# Patient Record
Sex: FEMALE | Race: WHITE | NOT HISPANIC OR LATINO | Employment: FULL TIME | URBAN - METROPOLITAN AREA
[De-identification: names, ages, dates, MRNs, and addresses within clinical notes are randomized per-mention and may not be internally consistent; named-entity substitution may affect disease eponyms.]

---

## 2020-02-06 ENCOUNTER — OFFICE VISIT (OUTPATIENT)
Dept: ORTHOPEDIC SURGERY | Facility: CLINIC | Age: 50
End: 2020-02-06

## 2020-02-06 VITALS — OXYGEN SATURATION: 98 % | BODY MASS INDEX: 33.46 KG/M2 | HEIGHT: 64 IN | WEIGHT: 196 LBS | HEART RATE: 87 BPM

## 2020-02-06 DIAGNOSIS — M17.11 PRIMARY OSTEOARTHRITIS OF RIGHT KNEE: Primary | ICD-10-CM

## 2020-02-06 DIAGNOSIS — M25.561 RIGHT KNEE PAIN, UNSPECIFIED CHRONICITY: ICD-10-CM

## 2020-02-06 PROCEDURE — 99204 OFFICE O/P NEW MOD 45 MIN: CPT | Performed by: ORTHOPAEDIC SURGERY

## 2020-02-06 RX ORDER — MELOXICAM 15 MG/1
TABLET ORAL
Qty: 60 TABLET | Refills: 0 | Status: SHIPPED | OUTPATIENT
Start: 2020-02-06

## 2020-02-06 NOTE — PROGRESS NOTES
Orthopaedic Clinic Note: Knee New Patient    Chief Complaint   Patient presents with   • Right Knee - Pain, Edema        HPI    Karina Hassan is a 50 y.o. female who presents with right knee pain for 6 day(s). Onset Has been atraumatic and gradual nature. Pain is localized to the anterior knee, popliteal region and is a 8/10 on the pain scale. Pain is described as aching and stabbing. Associated symptoms include pain, swelling, grinding, stiffness and giving way/buckling. The pain is worse with walking, standing, sitting, climbing stairs and leisure; resting, ice and heat make it better. Previous treatments have included: NSAIDS since symptom onset. Although some transient relief was reported with these interventions, these conservative measures have failed and symptoms have persisted. The patient is limited in daily activities and has had a significant decrease in quality of life as a result. She denies fevers, chills, or constitutional symptoms.    I have reviewed the following portions of the patient's history:History of Present Illness    History reviewed. No pertinent past medical history.   Past Surgical History:   Procedure Laterality Date   • ANKLE OPEN REDUCTION INTERNAL FIXATION Left    • CHOLECYSTECTOMY     • HERNIA REPAIR     • TIBIA FRACTURE SURGERY Left       Family History   Adopted: Yes     Social History     Socioeconomic History   • Marital status:      Spouse name: Not on file   • Number of children: Not on file   • Years of education: Not on file   • Highest education level: Not on file   Tobacco Use   • Smoking status: Never Smoker   • Smokeless tobacco: Never Used   Substance and Sexual Activity   • Alcohol use: Yes     Comment: rare   • Drug use: Never   • Sexual activity: Defer      Current Outpatient Medications on File Prior to Visit   Medication Sig Dispense Refill   • CINNAMON PO Take  by mouth Daily.     • Multiple Vitamins-Minerals (MULTIVITAMIN PO) Take  by mouth Daily.     •  "NON FORMULARY Daily. Tumeric, Keratin, Curcumin       No current facility-administered medications on file prior to visit.       Allergies   Allergen Reactions   • Other Anaphylaxis     Shellfish   GI intolerant -  strawberries and tomatoes        Review of Systems   Constitutional: Negative.    HENT: Negative.    Eyes: Negative.    Respiratory: Negative.    Cardiovascular: Negative.    Gastrointestinal: Negative.    Endocrine: Negative.    Genitourinary: Negative.    Musculoskeletal: Positive for arthralgias and joint swelling.   Skin: Negative.    Allergic/Immunologic: Negative.    Neurological: Negative.    Hematological: Negative.    Psychiatric/Behavioral: Negative.         The patient's Review of Systems was personally reviewed and confirmed as accurate.    The following portions of the patient's history were reviewed and updated as appropriate: allergies, current medications, past family history, past medical history, past social history, past surgical history and problem list.    Physical Exam  Pulse 87, height 162.6 cm (64\"), weight 88.9 kg (196 lb), SpO2 98 %.    Body mass index is 33.64 kg/m².    GENERAL APPEARANCE: awake, alert & oriented x 3, in no acute distress and well developed, well nourished  PSYCH: normal affect  LUNGS:  breathing nonlabored  EYES: sclera anicteric  CARDIOVASCULAR: palpable dorsalis pedis, palpable posterior tibial bilaterally. Capillary refill less than 2 seconds  EXTREMITIES: no clubbing, cyanosis  GAIT:  Antalgic            Right Lower Extremity Exam:   ----------  Hip Exam  ----------  FLEXION CONTRACTURE: None  FLEXION: 110 degrees  INTERNAL ROTATION: 20 degrees at 90 degrees of flexion   EXTERNAL ROTATION: 40 degrees at 90 degrees of flexion    PAIN WITH HIP MOTION: no  ----------  Knee Exam  ----------  ALIGNMENT: neutral, no varus or valgus deformity     RANGE OF MOTION:   Decreased (3-115 degrees) with no extensor lag  LIGAMENTOUS STABILITY:   stable to varus and valgus " stress at 0 and 30 degrees without any evidence of laxity     STRENGTH:  5/5 knee flexion, extension. 5/5 ankle dorsiflexion and plantarflexion.     PAIN WITH PALPATION: Tender to palpation in the anterior medial anterior lateral joint, palpable and tender Baker's cyst posteriorly   KNEE EFFUSION: Trace  PAIN WITH KNEE ROM: Yes  PATELLAR CREPITUS: yes, painful and symptomatic  SPECIAL EXAM FINDINGS:  Negative patellar compression    REFLEXES:  PATELLAR 2+/4  ACHILLES 2+/4    CLONUS: negative  STRAIGHT LEG TEST:   negative    SENSATION TO LIGHT TOUCH:  DEEP PERONEAL/SUPERFICIAL PERONEAL/SURAL/SAPHENOUS/TIBIAL:   intact    EDEMA:  no  ERYTHEMA:  no  WOUNDS/INCISIONS: no overlying skin problems.      Left Lower Extremity Exam:   ----------  Hip Exam  ----------  FLEXION CONTRACTURE: None  FLEXION: 110 degrees  INTERNAL ROTATION: 20 degrees at 90 degrees of flexion   EXTERNAL ROTATION: 40 degrees at 90 degrees of flexion    PAIN WITH HIP MOTION: no  ----------  Knee Exam  ----------  ALIGNMENT: neutral, no varus or valgus deformity     RANGE OF MOTION:  Normal (0-120 degrees) with no extensor lag or flexion contracture  LIGAMENTOUS STABILITY:   stable to varus and valgus stress at 0 and 30 degrees without any evidence of laxity     STRENGTH:  5/5 knee flexion, extension. 5/5 ankle dorsiflexion and plantarflexion.     PAIN WITH PALPATION: denies tenderness to palpation about the knee, denies medial or lateral joint line pain  KNEE EFFUSION: no  PAIN WITH KNEE ROM: no  PATELLAR CREPITUS: yes, asymptomatic  SPECIAL EXAM FINDINGS:  Negative patellar compression    REFLEXES:  PATELLAR 2+/4  ACHILLES 2+/4    CLONUS: negative  STRAIGHT LEG TEST:   negative    SENSATION TO LIGHT TOUCH:  DEEP PERONEAL/SUPERFICIAL PERONEAL/SURAL/SAPHENOUS/TIBIAL:   intact    EDEMA:  no  ERYTHEMA:  no  WOUNDS/INCISIONS: no overlying skin  problems.    ______________________________________________________________________  ______________________________________________________________________    RADIOGRAPHIC FINDINGS:   Indication: Right knee pain    Comparison: No prior xrays are available for comparison    Right knee(s) 4 views: Mild to moderate tricompartmental osteoarthritis with no acute bony injury or fracture.      Assessment/Plan:   Diagnosis Plan   1. Primary osteoarthritis of right knee  meloxicam (MOBIC) 15 MG tablet   2. Right knee pain, unspecified chronicity  XR Knee 4+ View Right     Patient appears to have an arthritic flareup.  I see no clinical radiographic evidence of acute bony injury or ligamentous pathology.  She does have a knee effusion with diffuse pain throughout the knee.  I recommended treatment options including oral anti-inflammatory versus cortisone injection.  She is agreeable to the anti-inflammatory.  She will follow-up in 2 weeks.    Americo Short MD  02/06/20  2:11 PM

## 2020-03-30 ENCOUNTER — TELEPHONE (OUTPATIENT)
Dept: ORTHOPEDIC SURGERY | Facility: CLINIC | Age: 50
End: 2020-03-30

## 2020-03-30 NOTE — TELEPHONE ENCOUNTER
CALLED PATIENT TO OFFER MYCHART VIDEO VISIT OR TELEPHONE VISIT. PATIENT DENIED BOTH OPTIONS AND RESCHEDULED APPOINTMENT.

## 2024-09-13 ENCOUNTER — HOSPITAL ENCOUNTER (INPATIENT)
Facility: HOSPITAL | Age: 54
LOS: 2 days | Discharge: HOME | DRG: 322 | End: 2024-09-15
Attending: INTERNAL MEDICINE | Admitting: INTERNAL MEDICINE
Payer: COMMERCIAL

## 2024-09-13 DIAGNOSIS — E11.65 TYPE 2 DIABETES MELLITUS WITH HYPERGLYCEMIA, WITHOUT LONG-TERM CURRENT USE OF INSULIN: Primary | ICD-10-CM

## 2024-09-13 PROBLEM — I21.19 STEMI INVOLVING OTH CORONARY ARTERY OF INFERIOR WALL: Status: ACTIVE | Noted: 2024-09-13

## 2024-09-13 LAB
ACT BLD: 165 SECONDS (ref 82–152)
GEN 5 2HR TROPONIN T REFLEX: 1422 NG/L
TROPONIN T DELTA: 757 NG/L
TROPONIN T SERPL HS-MCNC: 665 NG/L

## 2024-09-13 PROCEDURE — C1769 GUIDE WIRE: HCPCS | Performed by: INTERNAL MEDICINE

## 2024-09-13 PROCEDURE — 25010000002 EPTIFIBATIDE 20 MG/10ML SOLUTION: Performed by: INTERNAL MEDICINE

## 2024-09-13 PROCEDURE — C1894 INTRO/SHEATH, NON-LASER: HCPCS | Performed by: INTERNAL MEDICINE

## 2024-09-13 PROCEDURE — C1725 CATH, TRANSLUMIN NON-LASER: HCPCS | Performed by: INTERNAL MEDICINE

## 2024-09-13 PROCEDURE — C1874 STENT, COATED/COV W/DEL SYS: HCPCS | Performed by: INTERNAL MEDICINE

## 2024-09-13 PROCEDURE — B2111ZZ FLUOROSCOPY OF MULTIPLE CORONARY ARTERIES USING LOW OSMOLAR CONTRAST: ICD-10-PCS | Performed by: INTERNAL MEDICINE

## 2024-09-13 PROCEDURE — 93458 L HRT ARTERY/VENTRICLE ANGIO: CPT | Performed by: INTERNAL MEDICINE

## 2024-09-13 PROCEDURE — 027034Z DILATION OF CORONARY ARTERY, ONE ARTERY WITH DRUG-ELUTING INTRALUMINAL DEVICE, PERCUTANEOUS APPROACH: ICD-10-PCS | Performed by: INTERNAL MEDICINE

## 2024-09-13 PROCEDURE — 25010000002 MIDAZOLAM PER 1 MG: Performed by: INTERNAL MEDICINE

## 2024-09-13 PROCEDURE — 25010000002 HEPARIN (PORCINE) PER 1000 UNITS: Performed by: INTERNAL MEDICINE

## 2024-09-13 PROCEDURE — 99223 1ST HOSP IP/OBS HIGH 75: CPT | Performed by: INTERNAL MEDICINE

## 2024-09-13 PROCEDURE — 25010000002 DIPHENHYDRAMINE PER 50 MG: Performed by: INTERNAL MEDICINE

## 2024-09-13 PROCEDURE — 92941 PRQ TRLML REVSC TOT OCCL AMI: CPT | Performed by: INTERNAL MEDICINE

## 2024-09-13 PROCEDURE — 25510000001 IOPAMIDOL PER 1 ML: Performed by: INTERNAL MEDICINE

## 2024-09-13 PROCEDURE — C9606 PERC D-E COR REVASC W AMI S: HCPCS | Performed by: INTERNAL MEDICINE

## 2024-09-13 PROCEDURE — 84484 ASSAY OF TROPONIN QUANT: CPT | Performed by: INTERNAL MEDICINE

## 2024-09-13 PROCEDURE — 85347 COAGULATION TIME ACTIVATED: CPT

## 2024-09-13 PROCEDURE — B2151ZZ FLUOROSCOPY OF LEFT HEART USING LOW OSMOLAR CONTRAST: ICD-10-PCS | Performed by: INTERNAL MEDICINE

## 2024-09-13 PROCEDURE — C1887 CATHETER, GUIDING: HCPCS | Performed by: INTERNAL MEDICINE

## 2024-09-13 PROCEDURE — 4A023N7 MEASUREMENT OF CARDIAC SAMPLING AND PRESSURE, LEFT HEART, PERCUTANEOUS APPROACH: ICD-10-PCS | Performed by: INTERNAL MEDICINE

## 2024-09-13 PROCEDURE — 25010000002 FENTANYL CITRATE (PF) 50 MCG/ML SOLUTION: Performed by: INTERNAL MEDICINE

## 2024-09-13 DEVICE — XIENCE SKYPOINT™ EVEROLIMUS ELUTING CORONARY STENT SYSTEM 3.00 MM X 18 MM / RAPID-EXCHANGE
Type: IMPLANTABLE DEVICE | Site: CORONARY | Status: FUNCTIONAL
Brand: XIENCE SKYPOINT™

## 2024-09-13 RX ORDER — FENTANYL CITRATE 50 UG/ML
INJECTION, SOLUTION INTRAMUSCULAR; INTRAVENOUS
Status: DISCONTINUED | OUTPATIENT
Start: 2024-09-13 | End: 2024-09-13 | Stop reason: HOSPADM

## 2024-09-13 RX ORDER — ALPRAZOLAM 0.25 MG
0.25 TABLET ORAL 3 TIMES DAILY PRN
Status: DISCONTINUED | OUTPATIENT
Start: 2024-09-13 | End: 2024-09-15 | Stop reason: HOSPADM

## 2024-09-13 RX ORDER — NALOXONE HCL 0.4 MG/ML
0.4 VIAL (ML) INJECTION
Status: DISCONTINUED | OUTPATIENT
Start: 2024-09-13 | End: 2024-09-15 | Stop reason: HOSPADM

## 2024-09-13 RX ORDER — METOPROLOL SUCCINATE 25 MG/1
25 TABLET, EXTENDED RELEASE ORAL DAILY
Status: DISCONTINUED | OUTPATIENT
Start: 2024-09-13 | End: 2024-09-14

## 2024-09-13 RX ORDER — LIDOCAINE HYDROCHLORIDE 10 MG/ML
INJECTION, SOLUTION EPIDURAL; INFILTRATION; INTRACAUDAL; PERINEURAL
Status: DISCONTINUED | OUTPATIENT
Start: 2024-09-13 | End: 2024-09-13 | Stop reason: HOSPADM

## 2024-09-13 RX ORDER — ROSUVASTATIN CALCIUM 20 MG/1
20 TABLET, COATED ORAL NIGHTLY
Status: DISCONTINUED | OUTPATIENT
Start: 2024-09-13 | End: 2024-09-15 | Stop reason: HOSPADM

## 2024-09-13 RX ORDER — HYDROCODONE BITARTRATE AND ACETAMINOPHEN 7.5; 325 MG/1; MG/1
1 TABLET ORAL EVERY 4 HOURS PRN
Status: DISCONTINUED | OUTPATIENT
Start: 2024-09-13 | End: 2024-09-15 | Stop reason: HOSPADM

## 2024-09-13 RX ORDER — HEPARIN SODIUM 1000 [USP'U]/ML
INJECTION, SOLUTION INTRAVENOUS; SUBCUTANEOUS
Status: DISCONTINUED | OUTPATIENT
Start: 2024-09-13 | End: 2024-09-13 | Stop reason: HOSPADM

## 2024-09-13 RX ORDER — METOPROLOL TARTRATE 1 MG/ML
INJECTION, SOLUTION INTRAVENOUS
Status: DISCONTINUED | OUTPATIENT
Start: 2024-09-13 | End: 2024-09-13 | Stop reason: HOSPADM

## 2024-09-13 RX ORDER — DIPHENHYDRAMINE HYDROCHLORIDE 50 MG/ML
INJECTION INTRAMUSCULAR; INTRAVENOUS
Status: DISCONTINUED | OUTPATIENT
Start: 2024-09-13 | End: 2024-09-13 | Stop reason: HOSPADM

## 2024-09-13 RX ORDER — IOPAMIDOL 755 MG/ML
INJECTION, SOLUTION INTRAVASCULAR
Status: DISCONTINUED | OUTPATIENT
Start: 2024-09-13 | End: 2024-09-13 | Stop reason: HOSPADM

## 2024-09-13 RX ORDER — ACETAMINOPHEN 325 MG/1
650 TABLET ORAL EVERY 4 HOURS PRN
Status: DISCONTINUED | OUTPATIENT
Start: 2024-09-13 | End: 2024-09-15 | Stop reason: HOSPADM

## 2024-09-13 RX ORDER — SODIUM CHLORIDE 9 MG/ML
250 INJECTION, SOLUTION INTRAVENOUS ONCE AS NEEDED
Status: DISCONTINUED | OUTPATIENT
Start: 2024-09-13 | End: 2024-09-15 | Stop reason: HOSPADM

## 2024-09-13 RX ORDER — MORPHINE SULFATE 2 MG/ML
1 INJECTION, SOLUTION INTRAMUSCULAR; INTRAVENOUS EVERY 4 HOURS PRN
Status: DISCONTINUED | OUTPATIENT
Start: 2024-09-13 | End: 2024-09-15 | Stop reason: HOSPADM

## 2024-09-13 RX ORDER — MIDAZOLAM HYDROCHLORIDE 1 MG/ML
INJECTION INTRAMUSCULAR; INTRAVENOUS
Status: DISCONTINUED | OUTPATIENT
Start: 2024-09-13 | End: 2024-09-13 | Stop reason: HOSPADM

## 2024-09-13 RX ORDER — LOSARTAN POTASSIUM 25 MG/1
25 TABLET ORAL DAILY
Status: DISCONTINUED | OUTPATIENT
Start: 2024-09-13 | End: 2024-09-14

## 2024-09-13 RX ORDER — EPTIFIBATIDE 2 MG/ML
INJECTION, SOLUTION INTRAVENOUS
Status: DISCONTINUED | OUTPATIENT
Start: 2024-09-13 | End: 2024-09-13 | Stop reason: HOSPADM

## 2024-09-13 RX ORDER — NITROGLYCERIN 0.4 MG/1
0.4 TABLET SUBLINGUAL
Status: DISCONTINUED | OUTPATIENT
Start: 2024-09-13 | End: 2024-09-15 | Stop reason: HOSPADM

## 2024-09-13 RX ORDER — SODIUM CHLORIDE 9 MG/ML
100 INJECTION, SOLUTION INTRAVENOUS CONTINUOUS
Status: ACTIVE | OUTPATIENT
Start: 2024-09-13 | End: 2024-09-13

## 2024-09-14 ENCOUNTER — APPOINTMENT (OUTPATIENT)
Dept: CARDIOLOGY | Facility: HOSPITAL | Age: 54
DRG: 322 | End: 2024-09-14
Payer: COMMERCIAL

## 2024-09-14 PROBLEM — I21.11 STEMI INVOLVING RIGHT CORONARY ARTERY: Status: ACTIVE | Noted: 2024-09-14

## 2024-09-14 LAB
ANION GAP SERPL CALCULATED.3IONS-SCNC: 13 MMOL/L (ref 5–15)
ASCENDING AORTA: 2.8 CM
BH CV ECHO MEAS - AO MAX PG: 11.3 MMHG
BH CV ECHO MEAS - AO MEAN PG: 6 MMHG
BH CV ECHO MEAS - AO ROOT DIAM: 2.8 CM
BH CV ECHO MEAS - AO V2 MAX: 168 CM/SEC
BH CV ECHO MEAS - AO V2 VTI: 30.9 CM
BH CV ECHO MEAS - AVA(I,D): 2.15 CM2
BH CV ECHO MEAS - EDV(CUBED): 117.6 ML
BH CV ECHO MEAS - EDV(MOD-SP2): 84.1 ML
BH CV ECHO MEAS - EDV(MOD-SP4): 95.6 ML
BH CV ECHO MEAS - EF(MOD-BP): 57.3 %
BH CV ECHO MEAS - EF(MOD-SP2): 56 %
BH CV ECHO MEAS - EF(MOD-SP4): 54.7 %
BH CV ECHO MEAS - ESV(CUBED): 39.3 ML
BH CV ECHO MEAS - ESV(MOD-SP2): 37 ML
BH CV ECHO MEAS - ESV(MOD-SP4): 43.3 ML
BH CV ECHO MEAS - FS: 30.6 %
BH CV ECHO MEAS - IVS/LVPW: 0.78 CM
BH CV ECHO MEAS - IVSD: 0.7 CM
BH CV ECHO MEAS - LA DIMENSION: 3.2 CM
BH CV ECHO MEAS - LAT PEAK E' VEL: 11.3 CM/SEC
BH CV ECHO MEAS - LV DIASTOLIC VOL/BSA (35-75): 50.2 CM2
BH CV ECHO MEAS - LV MASS(C)D: 131.2 GRAMS
BH CV ECHO MEAS - LV MAX PG: 4.4 MMHG
BH CV ECHO MEAS - LV MEAN PG: 2 MMHG
BH CV ECHO MEAS - LV SYSTOLIC VOL/BSA (12-30): 22.7 CM2
BH CV ECHO MEAS - LV V1 MAX: 105 CM/SEC
BH CV ECHO MEAS - LV V1 VTI: 21.1 CM
BH CV ECHO MEAS - LVIDD: 4.9 CM
BH CV ECHO MEAS - LVIDS: 3.4 CM
BH CV ECHO MEAS - LVOT AREA: 3.1 CM2
BH CV ECHO MEAS - LVOT DIAM: 2 CM
BH CV ECHO MEAS - LVPWD: 0.9 CM
BH CV ECHO MEAS - MED PEAK E' VEL: 6.7 CM/SEC
BH CV ECHO MEAS - MV A MAX VEL: 113 CM/SEC
BH CV ECHO MEAS - MV DEC SLOPE: 445 CM/SEC2
BH CV ECHO MEAS - MV DEC TIME: 0.19 SEC
BH CV ECHO MEAS - MV E MAX VEL: 104 CM/SEC
BH CV ECHO MEAS - MV E/A: 0.92
BH CV ECHO MEAS - MV MAX PG: 4.5 MMHG
BH CV ECHO MEAS - MV MEAN PG: 2 MMHG
BH CV ECHO MEAS - MV P1/2T: 74.4 MSEC
BH CV ECHO MEAS - MV V2 VTI: 29.7 CM
BH CV ECHO MEAS - MVA(P1/2T): 3 CM2
BH CV ECHO MEAS - MVA(VTI): 2.23 CM2
BH CV ECHO MEAS - PA ACC TIME: 0.19 SEC
BH CV ECHO MEAS - PI END-D VEL: 78.6 CM/SEC
BH CV ECHO MEAS - SV(LVOT): 66.3 ML
BH CV ECHO MEAS - SV(MOD-SP2): 47.1 ML
BH CV ECHO MEAS - SV(MOD-SP4): 52.3 ML
BH CV ECHO MEAS - SVI(LVOT): 34.8 ML/M2
BH CV ECHO MEAS - SVI(MOD-SP2): 24.7 ML/M2
BH CV ECHO MEAS - SVI(MOD-SP4): 27.5 ML/M2
BH CV ECHO MEAS - TAPSE (>1.6): 2.5 CM
BH CV ECHO MEASUREMENTS AVERAGE E/E' RATIO: 11.56
BH CV XLRA - RV BASE: 3.4 CM
BH CV XLRA - RV LENGTH: 7.4 CM
BH CV XLRA - RV MID: 2.6 CM
BH CV XLRA - TDI S': 17.7 CM/SEC
BUN SERPL-MCNC: 8 MG/DL (ref 6–20)
BUN/CREAT SERPL: 12.5 (ref 7–25)
CALCIUM SPEC-SCNC: 8.1 MG/DL (ref 8.6–10.5)
CHLORIDE SERPL-SCNC: 99 MMOL/L (ref 98–107)
CHOLEST SERPL-MCNC: 277 MG/DL (ref 0–200)
CO2 SERPL-SCNC: 21 MMOL/L (ref 22–29)
CREAT SERPL-MCNC: 0.64 MG/DL (ref 0.57–1)
EGFRCR SERPLBLD CKD-EPI 2021: 104.5 ML/MIN/1.73
GLUCOSE BLDC GLUCOMTR-MCNC: 252 MG/DL (ref 70–130)
GLUCOSE BLDC GLUCOMTR-MCNC: 253 MG/DL (ref 70–130)
GLUCOSE BLDC GLUCOMTR-MCNC: 302 MG/DL (ref 70–130)
GLUCOSE BLDC GLUCOMTR-MCNC: 320 MG/DL (ref 70–130)
GLUCOSE SERPL-MCNC: 320 MG/DL (ref 65–99)
HBA1C MFR BLD: 11.7 % (ref 4.8–5.6)
HDLC SERPL-MCNC: 37 MG/DL (ref 40–60)
LDLC SERPL CALC-MCNC: 148 MG/DL (ref 0–100)
LDLC/HDLC SERPL: 3.86 {RATIO}
LEFT ATRIUM VOLUME INDEX: 18.5 ML/M2
LV EF 2D ECHO EST: 55 %
POTASSIUM SERPL-SCNC: 4.2 MMOL/L (ref 3.5–5.2)
SODIUM SERPL-SCNC: 133 MMOL/L (ref 136–145)
TRIGL SERPL-MCNC: 486 MG/DL (ref 0–150)
VLDLC SERPL-MCNC: 92 MG/DL (ref 5–40)

## 2024-09-14 PROCEDURE — 82948 REAGENT STRIP/BLOOD GLUCOSE: CPT

## 2024-09-14 PROCEDURE — 80048 BASIC METABOLIC PNL TOTAL CA: CPT | Performed by: INTERNAL MEDICINE

## 2024-09-14 PROCEDURE — 63710000001 INSULIN LISPRO (HUMAN) PER 5 UNITS: Performed by: NURSE PRACTITIONER

## 2024-09-14 PROCEDURE — 83036 HEMOGLOBIN GLYCOSYLATED A1C: CPT | Performed by: INTERNAL MEDICINE

## 2024-09-14 PROCEDURE — 63710000001 INSULIN GLARGINE PER 5 UNITS: Performed by: NURSE PRACTITIONER

## 2024-09-14 PROCEDURE — 93306 TTE W/DOPPLER COMPLETE: CPT | Performed by: INTERNAL MEDICINE

## 2024-09-14 PROCEDURE — 80061 LIPID PANEL: CPT | Performed by: INTERNAL MEDICINE

## 2024-09-14 PROCEDURE — 93005 ELECTROCARDIOGRAM TRACING: CPT | Performed by: INTERNAL MEDICINE

## 2024-09-14 PROCEDURE — 99232 SBSQ HOSP IP/OBS MODERATE 35: CPT | Performed by: INTERNAL MEDICINE

## 2024-09-14 PROCEDURE — 99222 1ST HOSP IP/OBS MODERATE 55: CPT | Performed by: NURSE PRACTITIONER

## 2024-09-14 PROCEDURE — 93010 ELECTROCARDIOGRAM REPORT: CPT | Performed by: INTERNAL MEDICINE

## 2024-09-14 PROCEDURE — 93306 TTE W/DOPPLER COMPLETE: CPT

## 2024-09-14 RX ORDER — LOSARTAN POTASSIUM 25 MG/1
25 TABLET ORAL
Status: DISCONTINUED | OUTPATIENT
Start: 2024-09-14 | End: 2024-09-15 | Stop reason: HOSPADM

## 2024-09-14 RX ORDER — METOPROLOL SUCCINATE 25 MG/1
25 TABLET, EXTENDED RELEASE ORAL
Status: DISCONTINUED | OUTPATIENT
Start: 2024-09-14 | End: 2024-09-15 | Stop reason: HOSPADM

## 2024-09-14 RX ORDER — DEXTROSE MONOHYDRATE 25 G/50ML
10-50 INJECTION, SOLUTION INTRAVENOUS
Status: DISCONTINUED | OUTPATIENT
Start: 2024-09-14 | End: 2024-09-14

## 2024-09-14 RX ORDER — INSULIN LISPRO 100 [IU]/ML
1-200 INJECTION, SOLUTION INTRAVENOUS; SUBCUTANEOUS AS NEEDED
Status: DISCONTINUED | OUTPATIENT
Start: 2024-09-14 | End: 2024-09-14

## 2024-09-14 RX ORDER — IBUPROFEN 600 MG/1
1 TABLET ORAL
Status: DISCONTINUED | OUTPATIENT
Start: 2024-09-14 | End: 2024-09-14

## 2024-09-14 RX ORDER — DEXTROSE MONOHYDRATE 25 G/50ML
25 INJECTION, SOLUTION INTRAVENOUS
Status: DISCONTINUED | OUTPATIENT
Start: 2024-09-14 | End: 2024-09-15 | Stop reason: HOSPADM

## 2024-09-14 RX ORDER — INSULIN LISPRO 100 [IU]/ML
2-7 INJECTION, SOLUTION INTRAVENOUS; SUBCUTANEOUS
Status: DISCONTINUED | OUTPATIENT
Start: 2024-09-14 | End: 2024-09-14

## 2024-09-14 RX ORDER — INSULIN LISPRO 100 [IU]/ML
2-7 INJECTION, SOLUTION INTRAVENOUS; SUBCUTANEOUS
Status: DISCONTINUED | OUTPATIENT
Start: 2024-09-14 | End: 2024-09-15 | Stop reason: HOSPADM

## 2024-09-14 RX ORDER — INSULIN LISPRO 100 [IU]/ML
1-200 INJECTION, SOLUTION INTRAVENOUS; SUBCUTANEOUS
Status: DISCONTINUED | OUTPATIENT
Start: 2024-09-14 | End: 2024-09-14

## 2024-09-14 RX ORDER — NICOTINE POLACRILEX 4 MG
15 LOZENGE BUCCAL
Status: DISCONTINUED | OUTPATIENT
Start: 2024-09-14 | End: 2024-09-15 | Stop reason: HOSPADM

## 2024-09-14 RX ORDER — IBUPROFEN 600 MG/1
1 TABLET ORAL
Status: DISCONTINUED | OUTPATIENT
Start: 2024-09-14 | End: 2024-09-15 | Stop reason: HOSPADM

## 2024-09-14 RX ORDER — NICOTINE POLACRILEX 4 MG
15 LOZENGE BUCCAL
Status: DISCONTINUED | OUTPATIENT
Start: 2024-09-14 | End: 2024-09-14

## 2024-09-14 RX ADMIN — LOSARTAN POTASSIUM 25 MG: 25 TABLET, FILM COATED ORAL at 08:45

## 2024-09-14 RX ADMIN — ROSUVASTATIN 20 MG: 20 TABLET, FILM COATED ORAL at 21:09

## 2024-09-14 RX ADMIN — INSULIN LISPRO 4 UNITS: 100 INJECTION, SOLUTION INTRAVENOUS; SUBCUTANEOUS at 13:27

## 2024-09-14 RX ADMIN — METOPROLOL SUCCINATE 25 MG: 25 TABLET, EXTENDED RELEASE ORAL at 08:45

## 2024-09-14 RX ADMIN — ACETAMINOPHEN 650 MG: 325 TABLET ORAL at 13:33

## 2024-09-14 RX ADMIN — INSULIN GLARGINE 10 UNITS: 100 INJECTION, SOLUTION SUBCUTANEOUS at 13:27

## 2024-09-14 RX ADMIN — INSULIN LISPRO 5 UNITS: 100 INJECTION, SOLUTION INTRAVENOUS; SUBCUTANEOUS at 22:37

## 2024-09-14 RX ADMIN — TICAGRELOR 90 MG: 90 TABLET ORAL at 21:09

## 2024-09-14 RX ADMIN — INSULIN LISPRO 5 UNITS: 100 INJECTION, SOLUTION INTRAVENOUS; SUBCUTANEOUS at 17:20

## 2024-09-14 RX ADMIN — TICAGRELOR 90 MG: 90 TABLET ORAL at 08:41

## 2024-09-15 ENCOUNTER — READMISSION MANAGEMENT (OUTPATIENT)
Dept: CALL CENTER | Facility: HOSPITAL | Age: 54
End: 2024-09-15
Payer: COMMERCIAL

## 2024-09-15 VITALS
DIASTOLIC BLOOD PRESSURE: 78 MMHG | HEIGHT: 64 IN | BODY MASS INDEX: 32.14 KG/M2 | WEIGHT: 188.27 LBS | OXYGEN SATURATION: 94 % | RESPIRATION RATE: 18 BRPM | TEMPERATURE: 98.3 F | SYSTOLIC BLOOD PRESSURE: 130 MMHG | HEART RATE: 75 BPM

## 2024-09-15 PROBLEM — E11.65 TYPE 2 DIABETES MELLITUS WITH HYPERGLYCEMIA: Status: ACTIVE | Noted: 2024-09-15

## 2024-09-15 LAB
GEN 5 2HR TROPONIN T REFLEX: 397 NG/L
GLUCOSE BLDC GLUCOMTR-MCNC: 227 MG/DL (ref 70–130)
GLUCOSE BLDC GLUCOMTR-MCNC: 251 MG/DL (ref 70–130)
TROPONIN T DELTA: -24 NG/L
TROPONIN T SERPL HS-MCNC: 421 NG/L

## 2024-09-15 PROCEDURE — 84484 ASSAY OF TROPONIN QUANT: CPT | Performed by: INTERNAL MEDICINE

## 2024-09-15 PROCEDURE — 82948 REAGENT STRIP/BLOOD GLUCOSE: CPT

## 2024-09-15 PROCEDURE — 99238 HOSP IP/OBS DSCHRG MGMT 30/<: CPT | Performed by: INTERNAL MEDICINE

## 2024-09-15 PROCEDURE — 63710000001 INSULIN LISPRO (HUMAN) PER 5 UNITS: Performed by: NURSE PRACTITIONER

## 2024-09-15 PROCEDURE — 63710000001 INSULIN GLARGINE PER 5 UNITS: Performed by: PEDIATRICS

## 2024-09-15 PROCEDURE — 99232 SBSQ HOSP IP/OBS MODERATE 35: CPT | Performed by: PEDIATRICS

## 2024-09-15 RX ORDER — METOPROLOL SUCCINATE 25 MG/1
25 TABLET, EXTENDED RELEASE ORAL
Qty: 90 TABLET | Refills: 3 | Status: SHIPPED | OUTPATIENT
Start: 2024-09-16

## 2024-09-15 RX ORDER — LOSARTAN POTASSIUM 25 MG/1
25 TABLET ORAL
Qty: 90 TABLET | Refills: 3 | Status: SHIPPED | OUTPATIENT
Start: 2024-09-16

## 2024-09-15 RX ORDER — NITROGLYCERIN 0.4 MG/1
0.4 TABLET SUBLINGUAL
Qty: 25 TABLET | Refills: 12 | Status: SHIPPED | OUTPATIENT
Start: 2024-09-15

## 2024-09-15 RX ORDER — ASPIRIN 81 MG/1
81 TABLET ORAL DAILY
Qty: 90 TABLET | Refills: 3 | Status: CANCELLED | OUTPATIENT
Start: 2024-09-15

## 2024-09-15 RX ORDER — PEN NEEDLE, DIABETIC 30 GX5/16"
1 NEEDLE, DISPOSABLE MISCELLANEOUS DAILY
Qty: 100 EACH | Refills: 0 | Status: SHIPPED | OUTPATIENT
Start: 2024-09-15

## 2024-09-15 RX ORDER — ROSUVASTATIN CALCIUM 20 MG/1
20 TABLET, COATED ORAL NIGHTLY
Qty: 90 TABLET | Refills: 3 | Status: SHIPPED | OUTPATIENT
Start: 2024-09-15 | End: 2024-09-15

## 2024-09-15 RX ORDER — ROSUVASTATIN CALCIUM 20 MG/1
20 TABLET, COATED ORAL NIGHTLY
Qty: 90 TABLET | Refills: 3 | Status: SHIPPED | OUTPATIENT
Start: 2024-09-15

## 2024-09-15 RX ORDER — NITROGLYCERIN 0.4 MG/1
0.4 TABLET SUBLINGUAL
Qty: 25 TABLET | Refills: 12 | Status: SHIPPED | OUTPATIENT
Start: 2024-09-15 | End: 2024-09-15

## 2024-09-15 RX ORDER — ASPIRIN 81 MG/1
81 TABLET ORAL DAILY
Qty: 90 TABLET | Refills: 3 | Status: SHIPPED | OUTPATIENT
Start: 2024-09-16 | End: 2024-09-15

## 2024-09-15 RX ORDER — METOPROLOL SUCCINATE 25 MG/1
25 TABLET, EXTENDED RELEASE ORAL
Qty: 90 TABLET | Refills: 3 | Status: SHIPPED | OUTPATIENT
Start: 2024-09-16 | End: 2024-09-15

## 2024-09-15 RX ORDER — ASPIRIN 81 MG/1
81 TABLET ORAL DAILY
Status: DISCONTINUED | OUTPATIENT
Start: 2024-09-15 | End: 2024-09-15 | Stop reason: HOSPADM

## 2024-09-15 RX ORDER — ASPIRIN 81 MG/1
81 TABLET ORAL DAILY
Qty: 90 TABLET | Refills: 3 | Status: SHIPPED | OUTPATIENT
Start: 2024-09-16

## 2024-09-15 RX ORDER — LOSARTAN POTASSIUM 25 MG/1
25 TABLET ORAL
Qty: 90 TABLET | Refills: 3 | Status: SHIPPED | OUTPATIENT
Start: 2024-09-16 | End: 2024-09-15

## 2024-09-15 RX ADMIN — INSULIN LISPRO 3 UNITS: 100 INJECTION, SOLUTION INTRAVENOUS; SUBCUTANEOUS at 08:12

## 2024-09-15 RX ADMIN — INSULIN GLARGINE 25 UNITS: 100 INJECTION, SOLUTION SUBCUTANEOUS at 08:11

## 2024-09-15 RX ADMIN — INSULIN LISPRO 4 UNITS: 100 INJECTION, SOLUTION INTRAVENOUS; SUBCUTANEOUS at 12:25

## 2024-09-15 RX ADMIN — ASPIRIN 81 MG: 81 TABLET, COATED ORAL at 10:27

## 2024-09-15 RX ADMIN — LOSARTAN POTASSIUM 25 MG: 25 TABLET, FILM COATED ORAL at 08:11

## 2024-09-15 RX ADMIN — METOPROLOL SUCCINATE 25 MG: 25 TABLET, EXTENDED RELEASE ORAL at 08:11

## 2024-09-15 RX ADMIN — TICAGRELOR 90 MG: 90 TABLET ORAL at 08:11

## 2024-09-15 NOTE — OUTREACH NOTE
Prep Survey      Flowsheet Row Responses   [facility] patient discharged from? [place]   Is LACE score < 7 ? Yes   Eligibility [facility]   Date of Admission 09/13/24   Date of Discharge 09/15/24   Discharge Disposition Home or Self Care   Discharge diagnosis Coronary artery disease   Does the patient have one of the following disease processes/diagnoses(primary or secondary)? Other   Does the patient have Home health ordered? No   Is there a DME ordered? No   Prep survey completed? Yes            PRETTY CHILDERS - Registered Nurse

## 2024-09-16 ENCOUNTER — TRANSITIONAL CARE MANAGEMENT TELEPHONE ENCOUNTER (OUTPATIENT)
Dept: CALL CENTER | Facility: HOSPITAL | Age: 54
End: 2024-09-16
Payer: COMMERCIAL

## 2024-09-16 ENCOUNTER — DOCUMENTATION (OUTPATIENT)
Dept: CARDIAC REHAB | Facility: HOSPITAL | Age: 54
End: 2024-09-16
Payer: COMMERCIAL

## 2024-09-16 LAB
ACT BLD: 152 SECONDS (ref 82–152)
ACT BLD: 342 SECONDS (ref 82–152)
ACT BLD: 611 SECONDS (ref 82–152)
QT INTERVAL: 406 MS
QTC INTERVAL: 477 MS

## 2024-09-17 ENCOUNTER — TRANSITIONAL CARE MANAGEMENT TELEPHONE ENCOUNTER (OUTPATIENT)
Dept: CALL CENTER | Facility: HOSPITAL | Age: 54
End: 2024-09-17
Payer: COMMERCIAL

## 2024-09-19 ENCOUNTER — OFFICE VISIT (OUTPATIENT)
Dept: FAMILY MEDICINE CLINIC | Facility: CLINIC | Age: 54
End: 2024-09-19
Payer: COMMERCIAL

## 2024-09-19 VITALS
OXYGEN SATURATION: 97 % | BODY MASS INDEX: 31.96 KG/M2 | HEIGHT: 64 IN | SYSTOLIC BLOOD PRESSURE: 114 MMHG | WEIGHT: 187.2 LBS | DIASTOLIC BLOOD PRESSURE: 66 MMHG | HEART RATE: 85 BPM

## 2024-09-19 DIAGNOSIS — I10 PRIMARY HYPERTENSION: ICD-10-CM

## 2024-09-19 DIAGNOSIS — Z79.4 TYPE 2 DIABETES MELLITUS WITH HYPERGLYCEMIA, WITH LONG-TERM CURRENT USE OF INSULIN: Primary | ICD-10-CM

## 2024-09-19 DIAGNOSIS — I21.11 STEMI INVOLVING RIGHT CORONARY ARTERY: ICD-10-CM

## 2024-09-19 DIAGNOSIS — R22.2 MASS ON BACK: ICD-10-CM

## 2024-09-19 DIAGNOSIS — E11.65 TYPE 2 DIABETES MELLITUS WITH HYPERGLYCEMIA, WITH LONG-TERM CURRENT USE OF INSULIN: Primary | ICD-10-CM

## 2024-09-19 PROCEDURE — 99495 TRANSJ CARE MGMT MOD F2F 14D: CPT | Performed by: INTERNAL MEDICINE

## 2024-09-19 PROCEDURE — 3046F HEMOGLOBIN A1C LEVEL >9.0%: CPT | Performed by: INTERNAL MEDICINE

## 2024-09-20 ENCOUNTER — OFFICE VISIT (OUTPATIENT)
Dept: CARDIOLOGY | Facility: HOSPITAL | Age: 54
End: 2024-09-20
Payer: COMMERCIAL

## 2024-09-20 VITALS
DIASTOLIC BLOOD PRESSURE: 61 MMHG | WEIGHT: 188 LBS | BODY MASS INDEX: 32.1 KG/M2 | HEIGHT: 64 IN | OXYGEN SATURATION: 96 % | SYSTOLIC BLOOD PRESSURE: 125 MMHG | HEART RATE: 83 BPM

## 2024-09-20 DIAGNOSIS — I21.19 STEMI INVOLVING OTH CORONARY ARTERY OF INFERIOR WALL: Primary | ICD-10-CM

## 2024-09-20 DIAGNOSIS — Z79.4 TYPE 2 DIABETES MELLITUS WITH HYPERGLYCEMIA, WITH LONG-TERM CURRENT USE OF INSULIN: ICD-10-CM

## 2024-09-20 DIAGNOSIS — E11.65 TYPE 2 DIABETES MELLITUS WITH HYPERGLYCEMIA, WITH LONG-TERM CURRENT USE OF INSULIN: ICD-10-CM

## 2024-09-24 ENCOUNTER — DOCUMENTATION (OUTPATIENT)
Dept: CARDIAC REHAB | Facility: HOSPITAL | Age: 54
End: 2024-09-24
Payer: COMMERCIAL

## 2024-09-26 ENCOUNTER — PATIENT ROUNDING (BHMG ONLY) (OUTPATIENT)
Dept: FAMILY MEDICINE CLINIC | Facility: CLINIC | Age: 54
End: 2024-09-26
Payer: COMMERCIAL

## 2024-09-27 ENCOUNTER — TRANSCRIBE ORDERS (OUTPATIENT)
Dept: CARDIAC REHAB | Facility: HOSPITAL | Age: 54
End: 2024-09-27
Payer: COMMERCIAL

## 2024-09-27 ENCOUNTER — HOSPITAL ENCOUNTER (OUTPATIENT)
Dept: DIABETES SERVICES | Facility: HOSPITAL | Age: 54
Setting detail: RECURRING SERIES
Discharge: HOME | End: 2024-09-27
Payer: COMMERCIAL

## 2024-09-27 ENCOUNTER — DOCUMENTATION (OUTPATIENT)
Dept: DIABETES SERVICES | Facility: HOSPITAL | Age: 54
End: 2024-09-27
Payer: COMMERCIAL

## 2024-09-27 DIAGNOSIS — Z95.5 STENTED CORONARY ARTERY: Primary | ICD-10-CM

## 2024-09-27 PROCEDURE — G0109 DIAB MANAGE TRN IND/GROUP: HCPCS | Performed by: REGISTERED NURSE

## 2024-10-02 ENCOUNTER — OFFICE VISIT (OUTPATIENT)
Dept: FAMILY MEDICINE CLINIC | Facility: CLINIC | Age: 54
End: 2024-10-02
Payer: COMMERCIAL

## 2024-10-02 VITALS
OXYGEN SATURATION: 97 % | HEIGHT: 64 IN | RESPIRATION RATE: 16 BRPM | HEART RATE: 82 BPM | DIASTOLIC BLOOD PRESSURE: 80 MMHG | SYSTOLIC BLOOD PRESSURE: 124 MMHG | WEIGHT: 190 LBS | TEMPERATURE: 97.1 F | BODY MASS INDEX: 32.44 KG/M2

## 2024-10-02 DIAGNOSIS — G89.29 CHRONIC NECK PAIN: ICD-10-CM

## 2024-10-02 DIAGNOSIS — Z79.4 TYPE 2 DIABETES MELLITUS WITH HYPERGLYCEMIA, WITH LONG-TERM CURRENT USE OF INSULIN: Primary | ICD-10-CM

## 2024-10-02 DIAGNOSIS — I10 HYPERTENSION, UNSPECIFIED TYPE: ICD-10-CM

## 2024-10-02 DIAGNOSIS — E11.65 TYPE 2 DIABETES MELLITUS WITH HYPERGLYCEMIA, WITH LONG-TERM CURRENT USE OF INSULIN: Primary | ICD-10-CM

## 2024-10-02 DIAGNOSIS — M54.2 CHRONIC NECK PAIN: ICD-10-CM

## 2024-10-02 DIAGNOSIS — T14.8XXA MUSCLE STRAIN: ICD-10-CM

## 2024-10-02 LAB
EXPIRATION DATE: ABNORMAL
GLUCOSE BLDC GLUCOMTR-MCNC: 340 MG/DL (ref 70–130)
Lab: ABNORMAL

## 2024-10-02 PROCEDURE — 1159F MED LIST DOCD IN RCRD: CPT | Performed by: INTERNAL MEDICINE

## 2024-10-02 PROCEDURE — 99214 OFFICE O/P EST MOD 30 MIN: CPT | Performed by: INTERNAL MEDICINE

## 2024-10-02 PROCEDURE — 1160F RVW MEDS BY RX/DR IN RCRD: CPT | Performed by: INTERNAL MEDICINE

## 2024-10-02 PROCEDURE — 1126F AMNT PAIN NOTED NONE PRSNT: CPT | Performed by: INTERNAL MEDICINE

## 2024-10-02 PROCEDURE — 3046F HEMOGLOBIN A1C LEVEL >9.0%: CPT | Performed by: INTERNAL MEDICINE

## 2024-10-02 PROCEDURE — 82948 REAGENT STRIP/BLOOD GLUCOSE: CPT | Performed by: INTERNAL MEDICINE

## 2024-10-02 RX ORDER — ROSUVASTATIN CALCIUM 20 MG/1
20 TABLET, COATED ORAL NIGHTLY
Qty: 90 TABLET | Refills: 3 | Status: SHIPPED | OUTPATIENT
Start: 2024-10-02

## 2024-10-02 RX ORDER — LOSARTAN POTASSIUM 25 MG/1
25 TABLET ORAL
Qty: 90 TABLET | Refills: 3 | Status: SHIPPED | OUTPATIENT
Start: 2024-10-02

## 2024-10-02 RX ORDER — ASPIRIN 81 MG/1
81 TABLET ORAL DAILY
Qty: 90 TABLET | Refills: 3 | Status: SHIPPED | OUTPATIENT
Start: 2024-10-02

## 2024-10-02 RX ORDER — METOPROLOL SUCCINATE 25 MG/1
25 TABLET, EXTENDED RELEASE ORAL
Qty: 90 TABLET | Refills: 3 | Status: SHIPPED | OUTPATIENT
Start: 2024-10-02

## 2024-10-02 RX ORDER — BACLOFEN 10 MG/1
10 TABLET ORAL NIGHTLY PRN
Qty: 7 TABLET | Refills: 0 | Status: SHIPPED | OUTPATIENT
Start: 2024-10-02

## 2024-10-02 NOTE — PROGRESS NOTES
Chief Complaint   Patient presents with    Diabetes     2 week follow up        HPI:  Karina Hassan is a 54 y.o. female who presents today for follow-up diabetes.  Continues to have hyperglycemia throughout the day.  Fasting sugars typically 180.    ROS:  Constitutional: no fevers, night sweats or unexplained weight loss  Eyes: no vision changes  ENT: no runny nose, ear pain, sore throat  Cardio: no chest pain, palpitations  Pulm: no shortness of breath, wheezing, or cough  GI: no abdominal pain or changes in bowel movements  : no difficulty urinating  MSK: no difficulty ambulating, no joint pain  Neuro: no weakness, dizziness or headache  Psych: no trouble sleeping  Endo: no change in appetite      Past Medical History:   Diagnosis Date    Abnormal ECG     Allergic Childhood    Strawberries, tomatoes, shellfish, preservatives    Diabetes mellitus 9.13 24    Myocardial infarction 9.13.24      Family History   Adopted: Yes      Social History     Socioeconomic History    Marital status:    Tobacco Use    Smoking status: Never     Passive exposure: Never    Smokeless tobacco: Never   Vaping Use    Vaping status: Never Used   Substance and Sexual Activity    Alcohol use: Never     Comment: rare    Drug use: Never    Sexual activity: Yes     Partners: Male      Allergies   Allergen Reactions    Other Anaphylaxis     Shellfish   GI intolerant -  strawberries and tomatoes    Strawberry Nausea And Vomiting    Tomato Nausea And Vomiting    Latex Itching and Rash      Immunization History   Administered Date(s) Administered    Palivizumab (RSV IGG Infants/children) 01/22/2013        PE:  Vitals:    10/02/24 0849   BP: 124/80   Pulse: 82   Resp: 16   Temp: 97.1 °F (36.2 °C)   SpO2: 97%      Body mass index is 32.61 kg/m².    Gen Appearance: NAD  HEENT: Normocephalic, PERRLA, no thyromegaly, trache midline  Heart: RRR, normal S1 and S2, no murmur  Lungs: CTA b/l, no wheezing, no crackles  Abdomen: Soft, non-tender,  "non-distended, no guarding and BSx4  MSK: Moves all extremities well, normal gait, no peripheral edema  Pulses: Palpable and equal b/l  Lymph nodes: No palpable lymphadenopathy   Neuro: No focal deficits      Current Outpatient Medications   Medication Sig Dispense Refill    aspirin 81 MG EC tablet Take 1 tablet by mouth Daily. 90 tablet 3    CINNAMON PO Take  by mouth Daily.      Insulin Glargine (LANTUS SOLOSTAR) 100 UNIT/ML injection pen Inject 30 Units under the skin into the appropriate area as directed Daily. 27 mL 3    Insulin Pen Needle (Pen Needles 5/16\") 31G X 8 MM misc Use pen needle as directed Daily. 100 each 0    losartan (COZAAR) 25 MG tablet Take 1 tablet by mouth Daily. 90 tablet 3    metFORMIN (GLUCOPHAGE) 500 MG tablet Take 1 tablet by mouth 2 (Two) Times a Day With Meals. 60 tablet 0    metoprolol succinate XL (TOPROL-XL) 25 MG 24 hr tablet Take 1 tablet by mouth Daily. 90 tablet 3    Multiple Vitamins-Minerals (MULTIVITAMIN PO) Take  by mouth Daily.      nitroglycerin (NITROSTAT) 0.4 MG SL tablet Place 1 tablet under the tongue every 5 minutes As Needed for Chest Pain (Systolic BP>100). Take no more than 3 doses in 15 minutes. 25 tablet 12    NON FORMULARY Daily. Tumeric, Keratin, Curcumin      rosuvastatin (CRESTOR) 20 MG tablet Take 1 tablet by mouth Every Night. 90 tablet 3    ticagrelor (BRILINTA) 90 MG tablet tablet Take 1 tablet by mouth 2 (Two) Times a Day. 180 tablet 3    baclofen (LIORESAL) 10 MG tablet Take 1 tablet by mouth At Night As Needed for Muscle Spasms. 7 tablet 0     No current facility-administered medications for this visit.      Increase long-acting insulin to 30 units.  Counseled on dietary and lifestyle changes.  Fasting sugars reported to [facility] in the next 2 weeks.  Follow-up in office 1 month.    Diagnoses and all orders for this visit:    1. Type 2 diabetes mellitus with hyperglycemia, with long-term current use of insulin (Primary)  -     POCT Glucose    2. Chronic " neck pain    3. Hypertension, unspecified type    4. Muscle strain  -     baclofen (LIORESAL) 10 MG tablet; Take 1 tablet by mouth At Night As Needed for Muscle Spasms.  Dispense: 7 tablet; Refill: 0    Other orders  -     aspirin 81 MG EC tablet; Take 1 tablet by mouth Daily.  Dispense: 90 tablet; Refill: 3  -     Insulin Glargine (LANTUS SOLOSTAR) 100 UNIT/ML injection pen; Inject 30 Units under the skin into the appropriate area as directed Daily.  Dispense: 27 mL; Refill: 3  -     ticagrelor (BRILINTA) 90 MG tablet tablet; Take 1 tablet by mouth 2 (Two) Times a Day.  Dispense: 180 tablet; Refill: 3  -     rosuvastatin (CRESTOR) 20 MG tablet; Take 1 tablet by mouth Every Night.  Dispense: 90 tablet; Refill: 3  -     metoprolol succinate XL (TOPROL-XL) 25 MG 24 hr tablet; Take 1 tablet by mouth Daily.  Dispense: 90 tablet; Refill: 3  -     losartan (COZAAR) 25 MG tablet; Take 1 tablet by mouth Daily.  Dispense: 90 tablet; Refill: 3         No follow-ups on file.     Dictated Utilizing Dragon Dictation    Please note that portions of this note were completed with a voice recognition program.    Part of this note may be an electronic transcription/translation of spoken language to printed text using the Dragon Dictation System.

## 2024-10-21 ENCOUNTER — HOSPITAL ENCOUNTER (OUTPATIENT)
Dept: ULTRASOUND IMAGING | Facility: HOSPITAL | Age: 54
Discharge: HOME | End: 2024-10-21
Admitting: INTERNAL MEDICINE
Payer: COMMERCIAL

## 2024-10-21 DIAGNOSIS — R22.2 MASS ON BACK: ICD-10-CM

## 2024-10-21 PROCEDURE — 76705 ECHO EXAM OF ABDOMEN: CPT

## 2024-11-04 ENCOUNTER — HOSPITAL ENCOUNTER (OUTPATIENT)
Dept: DIABETES SERVICES | Facility: HOSPITAL | Age: 54
Discharge: HOME | End: 2024-11-04
Payer: COMMERCIAL

## 2024-11-05 ENCOUNTER — OFFICE VISIT (OUTPATIENT)
Dept: FAMILY MEDICINE CLINIC | Facility: CLINIC | Age: 54
End: 2024-11-05
Payer: COMMERCIAL

## 2024-11-05 VITALS
HEIGHT: 64 IN | SYSTOLIC BLOOD PRESSURE: 120 MMHG | DIASTOLIC BLOOD PRESSURE: 74 MMHG | BODY MASS INDEX: 32.5 KG/M2 | WEIGHT: 190.4 LBS | OXYGEN SATURATION: 97 % | HEART RATE: 79 BPM

## 2024-11-05 DIAGNOSIS — R40.0 DAYTIME SLEEPINESS: ICD-10-CM

## 2024-11-05 DIAGNOSIS — I10 HYPERTENSION, UNSPECIFIED TYPE: ICD-10-CM

## 2024-11-05 DIAGNOSIS — E11.65 TYPE 2 DIABETES MELLITUS WITH HYPERGLYCEMIA, WITH LONG-TERM CURRENT USE OF INSULIN: Primary | ICD-10-CM

## 2024-11-05 DIAGNOSIS — E78.5 HYPERLIPIDEMIA, UNSPECIFIED HYPERLIPIDEMIA TYPE: ICD-10-CM

## 2024-11-05 DIAGNOSIS — Z79.4 TYPE 2 DIABETES MELLITUS WITH HYPERGLYCEMIA, WITH LONG-TERM CURRENT USE OF INSULIN: Primary | ICD-10-CM

## 2024-11-05 DIAGNOSIS — R53.83 OTHER FATIGUE: ICD-10-CM

## 2024-11-05 PROCEDURE — 3046F HEMOGLOBIN A1C LEVEL >9.0%: CPT | Performed by: INTERNAL MEDICINE

## 2024-11-05 PROCEDURE — 1126F AMNT PAIN NOTED NONE PRSNT: CPT | Performed by: INTERNAL MEDICINE

## 2024-11-05 PROCEDURE — 99214 OFFICE O/P EST MOD 30 MIN: CPT | Performed by: INTERNAL MEDICINE

## 2024-11-05 PROCEDURE — 1159F MED LIST DOCD IN RCRD: CPT | Performed by: INTERNAL MEDICINE

## 2024-11-05 PROCEDURE — 1160F RVW MEDS BY RX/DR IN RCRD: CPT | Performed by: INTERNAL MEDICINE

## 2024-11-05 RX ORDER — PEN NEEDLE, DIABETIC 30 GX5/16"
1 NEEDLE, DISPOSABLE MISCELLANEOUS DAILY
Qty: 100 EACH | Refills: 3 | Status: SHIPPED | OUTPATIENT
Start: 2024-11-05

## 2024-11-05 NOTE — PROGRESS NOTES
Chief Complaint   Patient presents with    Diabetes     Follow up        HPI:  Karina Hassan is a 54 y.o. female who presents today for follow-up diabetes.  Fasting sugars ranging 160-220.    ROS:  Constitutional: no fevers, night sweats or unexplained weight loss  Eyes: no vision changes  ENT: no runny nose, ear pain, sore throat  Cardio: no chest pain, palpitations  Pulm: no shortness of breath, wheezing, or cough  GI: no abdominal pain or changes in bowel movements  : no difficulty urinating  MSK: no difficulty ambulating, no joint pain  Neuro: no weakness, dizziness or headache  Psych: no trouble sleeping  Endo: no change in appetite      Past Medical History:   Diagnosis Date    Abnormal ECG     Allergic Childhood    Strawberries, tomatoes, shellfish, preservatives    Diabetes mellitus 9.13 24    Myocardial infarction 9.13.24      Family History   Adopted: Yes      Social History     Socioeconomic History    Marital status:    Tobacco Use    Smoking status: Never     Passive exposure: Never    Smokeless tobacco: Never   Vaping Use    Vaping status: Never Used   Substance and Sexual Activity    Alcohol use: Never     Comment: rare    Drug use: Never    Sexual activity: Yes     Partners: Male      Allergies   Allergen Reactions    Other Anaphylaxis     Shellfish   GI intolerant -  strawberries and tomatoes    Strawberry Nausea And Vomiting    Tomato Nausea And Vomiting    Latex Itching and Rash      Immunization History   Administered Date(s) Administered    Palivizumab (RSV IGG Infants/children) 01/22/2013        PE:  Vitals:    11/05/24 0903   BP: 120/74   Pulse: 79   SpO2: 97%      Body mass index is 32.67 kg/m².    Gen Appearance: NAD  HEENT: Normocephalic, PERRLA, no thyromegaly, trache midline  Heart: RRR, normal S1 and S2, no murmur  Lungs: CTA b/l, no wheezing, no crackles  Abdomen: Soft, non-tender, non-distended, no guarding and BSx4  MSK: Moves all extremities well, normal gait, no  "peripheral edema  Pulses: Palpable and equal b/l  Lymph nodes: No palpable lymphadenopathy   Neuro: No focal deficits      Current Outpatient Medications   Medication Sig Dispense Refill    aspirin 81 MG EC tablet Take 1 tablet by mouth Daily. 90 tablet 3    baclofen (LIORESAL) 10 MG tablet Take 1 tablet by mouth At Night As Needed for Muscle Spasms. 7 tablet 0    CINNAMON PO Take  by mouth Daily.      Insulin Glargine (LANTUS SOLOSTAR) 100 UNIT/ML injection pen Inject 30 Units under the skin into the appropriate area as directed Daily. 27 mL 3    Insulin Pen Needle (Pen Needles 5/16\") 31G X 8 MM misc Use pen needle as directed Daily. 100 each 3    losartan (COZAAR) 25 MG tablet Take 1 tablet by mouth Daily. 90 tablet 3    metFORMIN (GLUCOPHAGE) 500 MG tablet Take 1 tablet by mouth 2 (Two) Times a Day With Meals. 180 tablet 3    metoprolol succinate XL (TOPROL-XL) 25 MG 24 hr tablet Take 1 tablet by mouth Daily. 90 tablet 3    Multiple Vitamins-Minerals (MULTIVITAMIN PO) Take  by mouth Daily.      nitroglycerin (NITROSTAT) 0.4 MG SL tablet Place 1 tablet under the tongue every 5 minutes As Needed for Chest Pain (Systolic BP>100). Take no more than 3 doses in 15 minutes. 25 tablet 12    NON FORMULARY Daily. Tumeric, Keratin, Curcumin      rosuvastatin (CRESTOR) 20 MG tablet Take 1 tablet by mouth Every Night. 90 tablet 3    ticagrelor (BRILINTA) 90 MG tablet tablet Take 1 tablet by mouth 2 (Two) Times a Day. 180 tablet 3     No current facility-administered medications for this visit.      Fasting sugars continue to be a bit elevated.  Increase long-acting insulin to 30 units.  Continue metformin 500 mg twice daily.  Discussed GLP-1 agonist, she would like to hold off on this for now.    She has quite a bit of fatigue after taking her medication in the morning.  She has an upcoming visit with cardiology.  Blood pressure is normal today at 120/74.  May be secondary to losartan versus metoprolol?  The fatigue is " "specifically after she takes medication in AM.  Consider checking for sleep apnea if fatigue persist.    Follow-up 1 month for repeat A1c.    Diagnoses and all orders for this visit:    1. Type 2 diabetes mellitus with hyperglycemia, with long-term current use of insulin (Primary)    2. Daytime sleepiness    3. Hyperlipidemia, unspecified hyperlipidemia type    4. Other fatigue    5. Hypertension, unspecified type    Other orders  -     metFORMIN (GLUCOPHAGE) 500 MG tablet; Take 1 tablet by mouth 2 (Two) Times a Day With Meals.  Dispense: 180 tablet; Refill: 3  -     Insulin Pen Needle (Pen Needles 5/16\") 31G X 8 MM misc; Use pen needle as directed Daily.  Dispense: 100 each; Refill: 3         Return in about 1 month (around 12/5/2024).     Dictated Utilizing Dragon Dictation    Please note that portions of this note were completed with a voice recognition program.    Part of this note may be an electronic transcription/translation of spoken language to printed text using the Dragon Dictation System.  "

## 2024-11-14 ENCOUNTER — OFFICE VISIT (OUTPATIENT)
Dept: CARDIOLOGY | Facility: CLINIC | Age: 54
End: 2024-11-14
Payer: COMMERCIAL

## 2024-11-14 VITALS
OXYGEN SATURATION: 98 % | SYSTOLIC BLOOD PRESSURE: 130 MMHG | HEART RATE: 77 BPM | HEIGHT: 64 IN | BODY MASS INDEX: 32.54 KG/M2 | DIASTOLIC BLOOD PRESSURE: 74 MMHG | WEIGHT: 190.6 LBS

## 2024-11-14 DIAGNOSIS — I25.10 CORONARY ARTERY DISEASE INVOLVING NATIVE CORONARY ARTERY OF NATIVE HEART WITHOUT ANGINA PECTORIS: Primary | ICD-10-CM

## 2024-11-14 DIAGNOSIS — M79.643 PAIN OF HAND, UNSPECIFIED LATERALITY: Primary | ICD-10-CM

## 2024-11-14 DIAGNOSIS — I10 ESSENTIAL HYPERTENSION: ICD-10-CM

## 2024-11-14 DIAGNOSIS — E78.5 DYSLIPIDEMIA: ICD-10-CM

## 2024-11-14 NOTE — PROGRESS NOTES
"[facility] Cardiology    Patient ID: Karina Hassan is a 54 y.o. female.  : 1970   Contact: [phone]    Encounter date: 2024    PCP: Jules Barton DO      Chief complaint:   Chief Complaint   Patient presents with    STEMI involving oth coronary artery of inferior wall       Problem List:  CAD/Acute Inferior STEMI  [facility], 2024: EF 50%. Acute inferior ST elevation MI status post stenting of the proximal RCA using a 3.0 x 18 mm Xience drug-eluting stent postdilated with a 3.25 mm NC balloon. Minimal disease in the LAD and circumflex.  [facility], 2024: EF 55%. Trace to mild MR. Trace TR.  Hypertension  Dyslipidemia    Allergies   Allergen Reactions    Other Anaphylaxis     Shellfish   GI intolerant -  strawberries and tomatoes    Strawberry Nausea And Vomiting    Tomato Nausea And Vomiting    Latex Itching and Rash       Current Medications:    Current Outpatient Medications:     aspirin 81 MG EC tablet, Take 1 tablet by mouth Daily., Disp: 90 tablet, Rfl: 3    CINNAMON PO, Take  by mouth Daily., Disp: , Rfl:     Insulin Glargine (LANTUS SOLOSTAR) 100 UNIT/ML injection pen, Inject 30 Units under the skin into the appropriate area as directed Daily., Disp: 27 mL, Rfl: 3    Insulin Pen Needle (Pen Needles 516\") 31G X 8 MM misc, Use pen needle as directed Daily., Disp: 100 each, Rfl: 3    losartan (COZAAR) 25 MG tablet, Take 1 tablet by mouth Daily., Disp: 90 tablet, Rfl: 3    metFORMIN (GLUCOPHAGE) 500 MG tablet, Take 1 tablet by mouth 2 (Two) Times a Day With Meals., Disp: 180 tablet, Rfl: 3    metoprolol succinate XL (TOPROL-XL) 25 MG 24 hr tablet, Take 1 tablet by mouth Daily., Disp: 90 tablet, Rfl: 3    Multiple Vitamins-Minerals (MULTIVITAMIN PO), Take  by mouth Daily., Disp: , Rfl:     nitroglycerin (NITROSTAT) 0.4 MG SL tablet, Place 1 tablet under the tongue every 5 minutes As Needed for Chest Pain (Systolic BP>100). Take no more than 3 doses " "in 15 minutes., Disp: 25 tablet, Rfl: 12    NON FORMULARY, Daily. Tumeric, Keratin, Curcumin, Disp: , Rfl:     rosuvastatin (CRESTOR) 20 MG tablet, Take 1 tablet by mouth Every Night., Disp: 90 tablet, Rfl: 3    ticagrelor (BRILINTA) 90 MG tablet tablet, Take 1 tablet by mouth 2 (Two) Times a Day., Disp: 180 tablet, Rfl: 3    baclofen (LIORESAL) 10 MG tablet, Take 1 tablet by mouth At Night As Needed for Muscle Spasms. (Patient not taking: Reported on 11/14/2024), Disp: 7 tablet, Rfl: 0    HPI    Karina Hassan is a 54 y.o. female who presents today for a hospital follow up of CAD and cardiac risk factors. Since last visit, patient has been doing well overall from a cardiovascular standpoint. She reports some phantom chest discomfort yesterday that has since resolved. Patient stays busy and active by attending cardiac rehab 3 times weekly, walking every evening, doing chair exercises, and doing trampoline exercises. She has a left hand injury as she cut her hand while preparing butternut squash soup. Patient reports she received 3 internal and 9 external stitches in her hand following the incident which have since been removed. Some sensitivity is noted in his fingers. Patient notes that she is often fatigued throughout the day after taking her hypertensive medications and was advised to take them at night. She denies chest pain, shortness of breath, orthopnea, palpitations, edema, dizziness, and syncope.      The following portions of the patient's history were reviewed and updated as appropriate: allergies, current medications and problem list.    Pertinent positives as listed in the HPI.  All other systems reviewed are negative.         Vitals:    11/14/24 1001   BP: 130/74   BP Location: Left arm   Patient Position: Sitting   Cuff Size: Adult   Pulse: 77   SpO2: 98%   Weight: 86.5 kg (190 lb 9.6 oz)   Height: 162.6 cm (64.02\")       Physical Exam:  General: Alert and oriented.  Neck: Jugular venous " pressure is within normal limits. Carotids have normal upstrokes without bruits.   Cardiovascular: Heart has a nondisplaced focal PMI. Regular rate and rhythm. No murmur, gallop or rub.  Lungs: Clear, no rales or wheezes. Equal expansion is noted.   Extremities: Show no edema.  Skin: Warm and dry.  Neurologic: Nonfocal.     Diagnostic Data (reviewed with patient):  Lab Results   Component Value Date    GLUCOSE 320 (H) 09/14/2024    BUN 8 09/14/2024    CREATININE 0.64 09/14/2024    BCR 12.5 09/14/2024     (L) 09/14/2024    K 4.2 09/14/2024    CL 99 09/14/2024    CO2 21.0 (L) 09/14/2024    CALCIUM 8.1 (L) 09/14/2024     Lab Results   Component Value Date    CHOL 277 (H) 09/14/2024    TRIG 486 (H) 09/14/2024    HDL 37 (L) 09/14/2024     (H) 09/14/2024               Procedures      Assessment:    ICD-10-CM ICD-9-CM   1. Coronary artery disease involving native coronary artery of native heart without angina pectoris  I25.10 414.01   2. Essential hypertension  I10 401.9   3. Dyslipidemia  E78.5 272.4         Plan:  CMP and FLP ordered to be done in a month for review.   Patient was encouraged to continue to be active and have a healthy diet.  Continue on aspirin 81 mg for antiplatelet therapy.   Continue on losartan 25 mg daily for hypertension.  Try taking at night as she is feeling quite tired after she takes her medicines in the morning.  Continue on metoprolol 25 mg daily for rate control and hypertension.  Try taking at night as she is feeling quite tired after taking her medicines in the morning.  Continue on nitroglycerin 0.4 mg PRN for chest pain.  Continue on rosuvastatin 20 mg daily for hyperlipidemia.   Continue all other current medications.  F/up in 6 months, sooner if needed.      Scribed for Era Mahoney MD by Wendy Burt. 11/14/2024 10:17 EST    I Era Mahoney MD personally performed the services described in this documentation as scribed by the above individual in my  presence, and it is both accurate and complete.    Era Mahoney MD, FACC

## 2024-11-19 ENCOUNTER — OFFICE VISIT (OUTPATIENT)
Age: 54
End: 2024-11-19
Payer: COMMERCIAL

## 2024-11-19 VITALS
WEIGHT: 189.9 LBS | HEIGHT: 63 IN | BODY MASS INDEX: 33.65 KG/M2 | DIASTOLIC BLOOD PRESSURE: 74 MMHG | SYSTOLIC BLOOD PRESSURE: 120 MMHG

## 2024-11-19 DIAGNOSIS — S61.412S LACERATION OF LEFT HAND, FOREIGN BODY PRESENCE UNSPECIFIED, SEQUELA: Primary | ICD-10-CM

## 2024-11-19 NOTE — PROGRESS NOTES
[facility]     Office Visit       Date: 11/19/2024   Patient Name: Karina Hassan  MRN: [id number]  YOB: 1970    Referring Physician: Jules Barton, *     Chief Complaint:   Chief Complaint   Patient presents with    Left Hand - Pain     History of Present Illness:   Karina Hassan is a 54 y.o. female left-hand-dominant presented clinic as new patient with complaints of left hand pain, swelling, numbness and tingling.  She reports that while in [place] she sustained a cut to the palmar aspect of her left hand webspace, DOI 10/25/2024.  She evaluated at an emergency department and underwent a laceration repair.  She presented to her PCPs office last week for suture removal.  She presents today with complaints of pain, numbness and tingling throughout the hand.  She endorses 6/10 pain.  She states there is numbness and tingling that affects the thumb index long ring and small fingers.  This does not awaken her at night.  She has not attempted any therapy.  She reports that the dissolvable stitches that were placed are now visible and bothersome to her.  Otherwise no other complaints or concerns.    PMH: CAD, hypertension, dyslipidemia, type 2 diabetes.  History of STEMI.  Last hemoglobin A1c was 11.7.    Subjective   Review of Systems:   Review of Systems   Constitutional: Negative.  Negative for chills, fatigue and fever.   HENT: Negative.  Negative for congestion and dental problem.    Eyes: Negative.  Negative for blurred vision.   Respiratory: Negative.  Negative for shortness of breath.    Cardiovascular: Negative.  Negative for leg swelling.   Gastrointestinal: Negative.  Negative for abdominal pain.   Endocrine: Negative.  Negative for polyuria.   Genitourinary: Negative.  Negative for difficulty urinating.   Musculoskeletal:  Positive for arthralgias.   Skin: Negative.   "  Allergic/Immunologic: Negative.    Neurological: Negative.    Hematological: Negative.  Negative for adenopathy.   Psychiatric/Behavioral: Negative.  Negative for behavioral problems.       Pertinent review of systems per HPI    I reviewed the patient's chief complaint, history of present illness, review of systems, past medical history, surgical history, family history, social history, medications and allergy list in the EMR on 11/19/2024 and agree with the findings above.    Objective    Vital Signs:   Vitals:    11/19/24 0858   BP: 120/74   Weight: 86.1 kg (189 lb 14.4 oz)   Height: 160 cm (63\")     BMI:      General: No acute distress. Alert and oriented.   Cardiovascular: Palpable radial pulse.   Respiratory: Breathing is nonlabored.     Ortho Exam:  Examination of the left upper extremity demonstrates a 2 and half centimeter oblique laceration just proximal to the first webspace.  There is no erythema warmth or drainage.  Visible Vicryl sutures are noted.  These were trimmed.  There is a mild amount of swelling around the laceration with tenderness.  She endorses decreased sensation along the radial aspect of the index and long fingers.  Normal sensation along the ulnar aspect of the long and index finger.  Normal sensation along the radial and ulnar aspects of the thumb.  2 point discrimination is as follows: Small finger 8 mm, ring finger 8 mm, long finger 10 mm, index finger 6 mm, thumb 6 mm.  Positive Tinel, Durkan's, Phalen's at the wrist.  Negative Tinel's at the elbow and pronator.  Nontender to deep palpation of the pronator proximally.  She has full wrist and digit range of motion.  Perfused distally.    Imaging / Studies:    Imaging Results (Last 24 Hours)       ** No results found for the last 24 hours. **          Assessment / Plan    Assessment/Plan:   Karina Hassan is a 54 y.o. female with healed traumatic palmar first webspace laceration, DOI 10/25/2024.    I discussed with the " patient their clinical and radiographic findings demonstrate a laceration to the first webspace. We had a lengthy discussion regarding the pathophysiology of their diagnosis.  Her exam does not demonstrate dense numbness within the median nerve distribution or along the common digital nerve distributions.  Her to point discrimination testing is inconsistent with median nerve injury/digital nerve injury.  I would like her to begin a course of formal hand therapy with [facility] in [place].  They will work on nerve gliding exercises, soft tissue mobilization, and edema control.  I like to see her back in 6 weeks for reevaluation.  We may need to consider a EMG study in the future.  They were agreeable with the plan.  All questions and concerns were addressed.       ICD-10-CM ICD-9-CM   1. Laceration of left hand, foreign body presence unspecified, sequela  S61.412S 906.1     Follow Up:   Return in about 6 weeks (around 12/31/2024) for Follow Up.      Nai Schneider MD  [facility] [place] [facility]

## 2024-12-13 ENCOUNTER — OFFICE VISIT (OUTPATIENT)
Dept: FAMILY MEDICINE CLINIC | Facility: CLINIC | Age: 54
End: 2024-12-13
Payer: COMMERCIAL

## 2024-12-13 VITALS
SYSTOLIC BLOOD PRESSURE: 118 MMHG | DIASTOLIC BLOOD PRESSURE: 66 MMHG | BODY MASS INDEX: 33.63 KG/M2 | OXYGEN SATURATION: 98 % | HEIGHT: 63 IN | HEART RATE: 69 BPM | WEIGHT: 189.8 LBS

## 2024-12-13 DIAGNOSIS — E11.65 TYPE 2 DIABETES MELLITUS WITH HYPERGLYCEMIA, WITH LONG-TERM CURRENT USE OF INSULIN: Primary | ICD-10-CM

## 2024-12-13 DIAGNOSIS — I10 HYPERTENSION, UNSPECIFIED TYPE: ICD-10-CM

## 2024-12-13 DIAGNOSIS — Z79.4 TYPE 2 DIABETES MELLITUS WITH HYPERGLYCEMIA, WITH LONG-TERM CURRENT USE OF INSULIN: Primary | ICD-10-CM

## 2024-12-13 DIAGNOSIS — E78.5 HYPERLIPIDEMIA, UNSPECIFIED HYPERLIPIDEMIA TYPE: ICD-10-CM

## 2024-12-13 LAB
EXPIRATION DATE: ABNORMAL
HBA1C MFR BLD: 9.2 % (ref 4.5–5.7)
Lab: ABNORMAL

## 2024-12-13 PROCEDURE — 3078F DIAST BP <80 MM HG: CPT | Performed by: INTERNAL MEDICINE

## 2024-12-13 PROCEDURE — 3046F HEMOGLOBIN A1C LEVEL >9.0%: CPT | Performed by: INTERNAL MEDICINE

## 2024-12-13 PROCEDURE — 1126F AMNT PAIN NOTED NONE PRSNT: CPT | Performed by: INTERNAL MEDICINE

## 2024-12-13 PROCEDURE — 83036 HEMOGLOBIN GLYCOSYLATED A1C: CPT | Performed by: INTERNAL MEDICINE

## 2024-12-13 PROCEDURE — 1159F MED LIST DOCD IN RCRD: CPT | Performed by: INTERNAL MEDICINE

## 2024-12-13 PROCEDURE — 99214 OFFICE O/P EST MOD 30 MIN: CPT | Performed by: INTERNAL MEDICINE

## 2024-12-13 PROCEDURE — 1160F RVW MEDS BY RX/DR IN RCRD: CPT | Performed by: INTERNAL MEDICINE

## 2024-12-13 PROCEDURE — 3074F SYST BP LT 130 MM HG: CPT | Performed by: INTERNAL MEDICINE

## 2024-12-13 RX ORDER — ACYCLOVIR 400 MG/1
1 TABLET ORAL
Qty: 3 EACH | Refills: 11 | Status: SHIPPED | OUTPATIENT
Start: 2024-12-13

## 2024-12-13 RX ORDER — ACYCLOVIR 400 MG/1
1 TABLET ORAL DAILY
Qty: 1 EACH | Refills: 0 | Status: SHIPPED | OUTPATIENT
Start: 2024-12-13

## 2024-12-13 NOTE — PROGRESS NOTES
Chief Complaint   Patient presents with    Diabetes     A1c check        HPI:  Karina Hassan is a 54 y.o. female who presents today for f/u diabetes.     ROS:  Constitutional: no fevers, night sweats or unexplained weight loss  Eyes: no vision changes  ENT: no runny nose, ear pain, sore throat  Cardio: no chest pain, palpitations  Pulm: no shortness of breath, wheezing, or cough  GI: no abdominal pain or changes in bowel movements  : no difficulty urinating  MSK: no difficulty ambulating, no joint pain  Neuro: no weakness, dizziness or headache  Psych: no trouble sleeping  Endo: no change in appetite      Past Medical History:   Diagnosis Date    Abnormal ECG     Allergic Childhood    Strawberries, tomatoes, shellfish, preservatives    Ankle sprain     Coronary artery disease involving native coronary artery of native heart without angina pectoris 11/14/2024    Diabetes mellitus 9.13 24    Dislocation, shoulder 1982    Dyslipidemia 11/14/2024    Essential hypertension 11/14/2024    Fracture of ankle 1998    Fracture of wrist 1978    Fracture, fibula 1998    Left fibula/tibia plate w/7 screws    Fracture, foot 1982, 1998    Right growth plate bone    Fracture, tibia and fibula 1998    Myocardial infarction 9.13.24    Tear of meniscus of knee 1987    Broke knee cap    Wrist sprain       Family History   Adopted: Yes   Problem Relation Age of Onset    Broken bones Mother       Social History     Socioeconomic History    Marital status:    Tobacco Use    Smoking status: Never     Passive exposure: Never    Smokeless tobacco: Never   Vaping Use    Vaping status: Never Used   Substance and Sexual Activity    Alcohol use: Never     Comment: rare    Drug use: Never    Sexual activity: Yes     Partners: Male     Birth control/protection: Coitus interruptus      Allergies   Allergen Reactions    Other Anaphylaxis     Shellfish   GI intolerant -  strawberries and tomatoes    Strawberry Nausea And Vomiting     "Tomato Nausea And Vomiting    Latex Itching and Rash      Immunization History   Administered Date(s) Administered    Palivizumab (RSV IGG Infants/children) 01/22/2013        PE:  Vitals:    12/13/24 0849   BP: 118/66   Pulse: 69   SpO2: 98%      Body mass index is 33.63 kg/m².    Gen Appearance: NAD  HEENT: Normocephalic, PERRLA, no thyromegaly, trache midline  Heart: RRR, normal S1 and S2, no murmur  Lungs: CTA b/l, no wheezing, no crackles  Abdomen: Soft, non-tender, non-distended, no guarding and BSx4  MSK: Moves all extremities well, normal gait, no peripheral edema  Pulses: Palpable and equal b/l  Lymph nodes: No palpable lymphadenopathy   Neuro: No focal deficits      Current Outpatient Medications   Medication Sig Dispense Refill    aspirin 81 MG EC tablet Take 1 tablet by mouth Daily. 90 tablet 3    CINNAMON PO Take  by mouth Daily.      Insulin Glargine (LANTUS SOLOSTAR) 100 UNIT/ML injection pen Inject 30 Units under the skin into the appropriate area as directed Daily. 27 mL 3    Insulin Pen Needle (Pen Needles 5/16\") 31G X 8 MM misc Use pen needle as directed Daily. 100 each 3    losartan (COZAAR) 25 MG tablet Take 1 tablet by mouth Daily. 90 tablet 3    metFORMIN (GLUCOPHAGE) 500 MG tablet Take 1 tablet by mouth 2 (Two) Times a Day With Meals. 180 tablet 3    metoprolol succinate XL (TOPROL-XL) 25 MG 24 hr tablet Take 1 tablet by mouth Daily. 90 tablet 3    Multiple Vitamins-Minerals (MULTIVITAMIN PO) Take  by mouth Daily.      nitroglycerin (NITROSTAT) 0.4 MG SL tablet Place 1 tablet under the tongue every 5 minutes As Needed for Chest Pain (Systolic BP>100). Take no more than 3 doses in 15 minutes. 25 tablet 12    NON FORMULARY Daily. Tumeric, Keratin, Curcumin      rosuvastatin (CRESTOR) 20 MG tablet Take 1 tablet by mouth Every Night. 90 tablet 3    ticagrelor (BRILINTA) 90 MG tablet tablet Take 1 tablet by mouth 2 (Two) Times a Day. 180 tablet 3    baclofen (LIORESAL) 10 MG tablet Take 1 tablet by " mouth At Night As Needed for Muscle Spasms. (Patient not taking: Reported on 12/13/2024) 7 tablet 0    Continuous Glucose  (Dexcom G7 ) device Use 1 each Daily. 1 each 0    Continuous Glucose Sensor (Dexcom G7 Sensor) misc Use 1 each Every 10 (Ten) Days. 3 each 11     No current facility-administered medications for this visit.      Uncontrolled, increase Lantus to 22 units nightly.  Continue metformin.  Report on MyChart with fasting sugar readings in 2 weeks.    Diagnoses and all orders for this visit:    1. Type 2 diabetes mellitus with hyperglycemia, with long-term current use of insulin (Primary)  -     POC Glycosylated Hemoglobin (Hb A1C)  -     Continuous Glucose Sensor (Dexcom G7 Sensor) misc; Use 1 each Every 10 (Ten) Days.  Dispense: 3 each; Refill: 11  -     Continuous Glucose  (Dexcom G7 ) device; Use 1 each Daily.  Dispense: 1 each; Refill: 0    2. Hypertension, unspecified type    3. Hyperlipidemia, unspecified hyperlipidemia type         Return in about 3 months (around 3/13/2025) for a1c.     Dictated Utilizing Dragon Dictation    Please note that portions of this note were completed with a voice recognition program.    Part of this note may be an electronic transcription/translation of spoken language to printed text using the Dragon Dictation System.

## 2025-01-07 ENCOUNTER — TELEPHONE (OUTPATIENT)
Age: 55
End: 2025-01-07

## 2025-01-07 NOTE — TELEPHONE ENCOUNTER
Called to ask if patient wanted to reschedule her 1/7/25 appointment VENKAT TOSCANO. Patient sent iWattt message requesting to be rescheduled. 2 x call back NA EVERT[place]      HUB ok to schedule

## 2025-01-09 ENCOUNTER — OFFICE VISIT (OUTPATIENT)
Dept: ORTHOPEDIC SURGERY | Facility: CLINIC | Age: 55
End: 2025-01-09
Payer: COMMERCIAL

## 2025-01-09 VITALS
DIASTOLIC BLOOD PRESSURE: 82 MMHG | HEIGHT: 63 IN | BODY MASS INDEX: 33.49 KG/M2 | WEIGHT: 189 LBS | SYSTOLIC BLOOD PRESSURE: 120 MMHG

## 2025-01-09 DIAGNOSIS — S61.412S LACERATION OF LEFT HAND, FOREIGN BODY PRESENCE UNSPECIFIED, SEQUELA: Primary | ICD-10-CM

## 2025-01-09 PROBLEM — S61.412A LACERATION OF LEFT HAND: Status: ACTIVE | Noted: 2025-01-09

## 2025-01-09 NOTE — PROGRESS NOTES
"                                                                 [facility]     Office Visit       Date: 01/09/2025   Patient Name: Karina Hassan  MRN: [id number]  YOB: 1970    Referring Physician: No ref. provider found     Chief Complaint:   Chief Complaint   Patient presents with    Follow-up     7 week follow up -- healed traumatic palmar first webspace laceration     History of Present Illness:   Karina Hassan is a 54 y.o. female left-hand-dominant presenting to clinic for follow-up of healed traumatic palmar first webspace laceration, DOI 10/25/2024.  Patient began a course of formal hand therapy.  She reports that she has been doing well with therapy.  She has been compliant with therapy directed exercises and splinting.  She reports that her symptoms are 60 to 65% improved from her initial visit.  She over still complains of pain sensitivity overlying the scar.  She has difficulty with use of the index finger.  She continues to endorse difficulty with activity such as using a knife and opening jars.  The numbness and tingling to her hand has improved.  She continues to report numbness mostly in the index finger along the radial and ulnar borders.  This is improving and is now to the level of the PIP joint along the radial aspect.  No other complaints or concerns.    PMH: CAD, hypertension, dyslipidemia, type 2 diabetes.  History of STEMI.  Last hemoglobin A1c was 9.2.     Subjective   Review of Systems:   Review of Systems   Pertinent review of systems per HPI    I reviewed the patient's chief complaint, history of present illness, review of systems, past medical history, surgical history, family history, social history, medications and allergy list in the EMR on 01/09/2025 and agree with the findings above.    Objective    Vital Signs:   Vitals:    01/09/25 1154   BP: 120/82   Weight: 85.7 kg (189 lb)   Height: 160 cm (63\")     General: No acute distress. " Alert and oriented.   Cardiovascular: Palpable radial pulse.   Respiratory: Breathing is nonlabored.   Ortho Exam:  Examination of left upper extremity demonstrates a well-healed traumatic laceration along the volar aspect of the first webspace.  No erythema warmth or drainage.  Mildly tender to palpation with improved scar mobility.  No webspace contractures.  There is a positive Tinel's overlying the traumatic scar.  Sensation is grossly intact to light touch throughout the radial and ulnar borders of the digit.  She does report that this has improved along the radial border of the digit up to the level of the PIP joint.  She is able to make a full and composite fist.  Sensation intact to light touch throughout the remainder of the digits.  Weakly positive Tinel, Durkan's, Phalen's at the wrist.  Warm and well perfused distally.    Imaging / Studies:    Imaging Results (Last 24 Hours)       ** No results found for the last 24 hours. **          Assessment / Plan    Assessment/Plan:   Karina Hassan is a 54 y.o. female with healed traumatic left hand palmar first webspace laceration, DOI 10/25/2024.     The patient has had slow and steady improvements with a course of hand therapy.  She is most bothered by the scar sensitivity within the palmar aspect of the hand.  I would like her to continue to work on scar mobilization and range of motion.  She also may have a component of median nerve irritation.  They will continue with nerve glides at therapy.  We may consider an EMG in the future.  I discussed with her additional options moving forward which may include a corticosteroid injection along the flexor tendon sheath of the index finger and along the scar.  She would like to hold off on this as she has had increased blood glucose levels after steroids.  Additionally, I discussed operative treatment with wound exploration and likely digital neurolysis.  Patient expressed understanding of all of her options.   She is improving with time and therefore we will continue with therapy.  I will see her back in 2 months for reevaluation.  All questions and concerns were addressed.  She is agreeable.      ICD-10-CM ICD-9-CM   1. Laceration of left hand, foreign body presence unspecified, sequela  S61.412S 906.1     Follow Up:   Return in about 2 months (around 3/9/2025) for Follow Up.      Nai Schneider MD  [facility] [place] [facility]

## 2025-02-24 ENCOUNTER — OFFICE VISIT (OUTPATIENT)
Dept: FAMILY MEDICINE CLINIC | Facility: CLINIC | Age: 55
End: 2025-02-24
Payer: COMMERCIAL

## 2025-02-24 VITALS
HEIGHT: 63 IN | WEIGHT: 191 LBS | BODY MASS INDEX: 33.84 KG/M2 | SYSTOLIC BLOOD PRESSURE: 120 MMHG | OXYGEN SATURATION: 96 % | DIASTOLIC BLOOD PRESSURE: 74 MMHG | HEART RATE: 83 BPM

## 2025-02-24 DIAGNOSIS — I10 HYPERTENSION, UNSPECIFIED TYPE: ICD-10-CM

## 2025-02-24 DIAGNOSIS — Z12.11 COLON CANCER SCREENING: ICD-10-CM

## 2025-02-24 DIAGNOSIS — Z79.4 TYPE 2 DIABETES MELLITUS WITH HYPERGLYCEMIA, WITH LONG-TERM CURRENT USE OF INSULIN: Primary | ICD-10-CM

## 2025-02-24 DIAGNOSIS — G47.00 INSOMNIA, UNSPECIFIED TYPE: ICD-10-CM

## 2025-02-24 DIAGNOSIS — E11.65 TYPE 2 DIABETES MELLITUS WITH HYPERGLYCEMIA, WITH LONG-TERM CURRENT USE OF INSULIN: Primary | ICD-10-CM

## 2025-02-24 DIAGNOSIS — E78.5 HYPERLIPIDEMIA, UNSPECIFIED HYPERLIPIDEMIA TYPE: ICD-10-CM

## 2025-02-24 LAB
EXPIRATION DATE: ABNORMAL
HBA1C MFR BLD: 9.8 % (ref 4.5–5.7)
Lab: ABNORMAL

## 2025-02-24 PROCEDURE — 1160F RVW MEDS BY RX/DR IN RCRD: CPT | Performed by: INTERNAL MEDICINE

## 2025-02-24 PROCEDURE — 83036 HEMOGLOBIN GLYCOSYLATED A1C: CPT | Performed by: INTERNAL MEDICINE

## 2025-02-24 PROCEDURE — 3078F DIAST BP <80 MM HG: CPT | Performed by: INTERNAL MEDICINE

## 2025-02-24 PROCEDURE — 99215 OFFICE O/P EST HI 40 MIN: CPT | Performed by: INTERNAL MEDICINE

## 2025-02-24 PROCEDURE — 1126F AMNT PAIN NOTED NONE PRSNT: CPT | Performed by: INTERNAL MEDICINE

## 2025-02-24 PROCEDURE — 3074F SYST BP LT 130 MM HG: CPT | Performed by: INTERNAL MEDICINE

## 2025-02-24 PROCEDURE — 1159F MED LIST DOCD IN RCRD: CPT | Performed by: INTERNAL MEDICINE

## 2025-02-24 PROCEDURE — 3046F HEMOGLOBIN A1C LEVEL >9.0%: CPT | Performed by: INTERNAL MEDICINE

## 2025-02-25 NOTE — PROGRESS NOTES
Chief Complaint   Patient presents with    Diabetes     Follow up    Hypertension     Blood pressure had been higher at night, hard to fall asleep.        HPI:  Karina Hassan is a 55 y.o. female who presents today for follow-up diabetes and hypertension.    ROS:  Constitutional: no fevers, night sweats or unexplained weight loss  Eyes: no vision changes  ENT: no runny nose, ear pain, sore throat  Cardio: no chest pain, palpitations  Pulm: no shortness of breath, wheezing, or cough  GI: no abdominal pain or changes in bowel movements  : no difficulty urinating  MSK: no difficulty ambulating, no joint pain  Neuro: no weakness, dizziness or headache  Psych: no trouble sleeping  Endo: no change in appetite      Past Medical History:   Diagnosis Date    Abnormal ECG     Allergic Childhood    Strawberries, tomatoes, shellfish, preservatives    Ankle sprain     Coronary artery disease involving native coronary artery of native heart without angina pectoris 11/14/2024    Diabetes mellitus 9.13 24    Dislocation, shoulder 1982    Dyslipidemia 11/14/2024    Essential hypertension 11/14/2024    Fracture of ankle 1998    Fracture of wrist 1978    Fracture, fibula 1998    Left fibula/tibia plate w/7 screws    Fracture, foot 1982, 1998    Right growth plate bone    Fracture, tibia and fibula 1998    Myocardial infarction 9.13.24    Tear of meniscus of knee 1987    Broke knee cap    Wrist sprain       Family History   Adopted: Yes   Problem Relation Age of Onset    Broken bones Mother       Social History     Socioeconomic History    Marital status:    Tobacco Use    Smoking status: Never     Passive exposure: Never    Smokeless tobacco: Never   Vaping Use    Vaping status: Never Used   Substance and Sexual Activity    Alcohol use: Never     Comment: rare    Drug use: Never    Sexual activity: Yes     Partners: Male     Birth control/protection: Abstinence      Allergies   Allergen Reactions    Other Anaphylaxis  "    Shellfish   GI intolerant -  strawberries and tomatoes    Strawberry Nausea And Vomiting    Tomato Nausea And Vomiting    Latex Itching and Rash      Immunization History   Administered Date(s) Administered    Palivizumab (RSV IGG Infants/children) 01/22/2013        PE:  Vitals:    02/24/25 1321   BP: 120/74   Pulse: 83   SpO2: 96%      Body mass index is 33.84 kg/m².    Gen Appearance: NAD  HEENT: Normocephalic, PERRLA, no thyromegaly, trache midline  Heart: RRR, normal S1 and S2, no murmur  Lungs: CTA b/l, no wheezing, no crackles  Abdomen: Soft, non-tender, non-distended, no guarding and BSx4  MSK: Moves all extremities well, normal gait, no peripheral edema  Pulses: Palpable and equal b/l  Lymph nodes: No palpable lymphadenopathy   Neuro: No focal deficits      Current Outpatient Medications   Medication Sig Dispense Refill    aspirin 81 MG EC tablet Take 1 tablet by mouth Daily. 90 tablet 3    baclofen (LIORESAL) 10 MG tablet Take 1 tablet by mouth At Night As Needed for Muscle Spasms. 7 tablet 0    CINNAMON PO Take  by mouth Daily.      Continuous Glucose  (Dexcom G7 ) device Use 1 each Daily. 1 each 0    Continuous Glucose Sensor (Dexcom G7 Sensor) misc Use 1 each Every 10 (Ten) Days. 3 each 11    Insulin Glargine (LANTUS SOLOSTAR) 100 UNIT/ML injection pen Inject 30 Units under the skin into the appropriate area as directed Daily. 27 mL 3    Insulin Pen Needle (Pen Needles 5/16\") 31G X 8 MM misc Use pen needle as directed Daily. 100 each 3    losartan (COZAAR) 25 MG tablet Take 1 tablet by mouth Daily. 90 tablet 3    metFORMIN (GLUCOPHAGE) 500 MG tablet Take 1 tablet by mouth 2 (Two) Times a Day With Meals. 180 tablet 3    metoprolol succinate XL (TOPROL-XL) 25 MG 24 hr tablet Take 1 tablet by mouth Daily. 90 tablet 3    Multiple Vitamins-Minerals (MULTIVITAMIN PO) Take  by mouth Daily.      nitroglycerin (NITROSTAT) 0.4 MG SL tablet Place 1 tablet under the tongue every 5 minutes As " Needed for Chest Pain (Systolic BP>100). Take no more than 3 doses in 15 minutes. 25 tablet 12    NON FORMULARY Daily. Tumeric, Keratin, Curcumin      rosuvastatin (CRESTOR) 20 MG tablet Take 1 tablet by mouth Every Night. 90 tablet 3    ticagrelor (BRILINTA) 90 MG tablet tablet Take 1 tablet by mouth 2 (Two) Times a Day. 180 tablet 3     No current facility-administered medications for this visit.     Patient myalgia, mostly lower extremities. Typically has worsening side effects after taking nighttime medication. Recommend stopping crestor for 2 weeks, possible statin side effect. Consider lower potency statin if symptoms improve or discuss repatha with cardiology.     Counseled on dietary and lifestyle changes, increase lantus to 34 units nightly. Call clinic in a few weeks with pre-meal readings. Likely having post-meal glucose spikes. A1c is 9.8% and morning sugar averaging 150. Likely needs additional diabetic medication but need more glucose reading initially.     Counseling was given to patient for the following topics: diagnostic results, impressions, and risks and benefits of treatment options . Total time of the encounter was 40 minutes and 20 minutes was spent face to face counseling.       Diagnoses and all orders for this visit:    1. Type 2 diabetes mellitus with hyperglycemia, with long-term current use of insulin (Primary)  -     POC Glycosylated Hemoglobin (Hb A1C)    2. Hypertension, unspecified type    3. Hyperlipidemia, unspecified hyperlipidemia type    4. Colon cancer screening  -     Cologuard - Stool, Per Rectum; Future    5. Insomnia, unspecified type         Return in about 3 months (around 5/24/2025).     Dictated Utilizing Dragon Dictation    Please note that portions of this note were completed with a voice recognition program.    Part of this note may be an electronic transcription/translation of spoken language to printed text using the Dragon Dictation System.

## 2025-03-04 ENCOUNTER — OFFICE VISIT (OUTPATIENT)
Age: 55
End: 2025-03-04
Payer: COMMERCIAL

## 2025-03-04 VITALS
SYSTOLIC BLOOD PRESSURE: 126 MMHG | WEIGHT: 194.9 LBS | BODY MASS INDEX: 34.54 KG/M2 | HEIGHT: 63 IN | DIASTOLIC BLOOD PRESSURE: 78 MMHG

## 2025-03-04 DIAGNOSIS — S61.412D LACERATION OF LEFT HAND, FOREIGN BODY PRESENCE UNSPECIFIED, SUBSEQUENT ENCOUNTER: Primary | ICD-10-CM

## 2025-03-04 DIAGNOSIS — M65.312 TRIGGER FINGER OF LEFT THUMB: ICD-10-CM

## 2025-03-04 NOTE — PROGRESS NOTES
"                                                                 [facility]     Office Visit       Date: 03/04/2025   Patient Name: aKrina Hassan  MRN: [id number]  YOB: 1970    Referring Physician: No ref. provider found     Chief Complaint:   Chief Complaint   Patient presents with    Follow-up     2 month follow up Laceration of left hand, foreign body presence unspecified     History of Present Illness:   Karina Hassan is a 55 y.o. female left-hand-dominant presenting to clinic for follow-up of healed traumatic palmar first webspace laceration, DOI 10/25/2024.  She reports that she has been working with a therapist has had some improvements in her numbness and tingling.  She feels that this is along the radial aspect of the index finger and ulnar aspect of the thumb.  This is slowly improving.  He now complains of pain to the base of her left thumb.  No catching or locking.  No other complaints or concerns.    PMH: CAD, hypertension, dyslipidemia, type 2 diabetes.  History of STEMI.  Last hemoglobin A1c was 9.8.     Subjective   Review of Systems:   Review of Systems   All other systems reviewed and are negative.     Pertinent review of systems per HPI    I reviewed the patient's chief complaint, history of present illness, review of systems, past medical history, surgical history, family history, social history, medications and allergy list in the EMR on 02/25/2025 and agree with the findings above.    Objective    Vital Signs:   Vitals:    03/04/25 0908   BP: 126/78   Weight: 88.4 kg (194 lb 14.4 oz)   Height: 160 cm (62.99\")     General: No acute distress. Alert and oriented.   Cardiovascular: Palpable radial pulse.   Respiratory: Breathing is nonlabored.   Ortho Exam:  Examination of left upper extremity demonstrates a well-healed traumatic laceration along the volar aspect of the first webspace. No erythema warmth or drainage. Mildly tender to palpation with " improved scar mobility. No webspace contractures. No Tinel's overlying the traumatic scar. Sensation is grossly intact to light touch throughout the radial and ulnar borders of the index and thumb she does report that this has improved along the radial border of the digit up to the level of the PIP joint, but no significant change from her prior visit.  She is tender to the left thumb A1 pulley.  No catching or locking during exam.  She is able to make a full and composite fist. Sensation intact to light touch throughout the remainder of the digits. Weakly positive Tinel, Durkan's, Phalen's at the wrist. Warm and well perfused distally     Imaging / Studies:    Imaging Results (Last 24 Hours)       ** No results found for the last 24 hours. **          Assessment / Plan    Assessment/Plan:   Karina Hassan is a 55 y.o. female with healed traumatic left hand palmar first webspace laceration, DOI 10/25/2024 with a left trigger thumb.     The patient has had no significant improvements over the past several weeks to the tenderness to the webspace laceration as well as her numbness and tingling.  I discussed with her that our options include wound exploration with digital neurolysis.  However her last hemoglobin A1c was 9.8.  This precludes her from elective surgery.  Therefore we will continue to monitor her symptoms with continued therapy and time.  Additionally, she has symptoms of a left trigger thumb.  I offered her a corticosteroid injection today, however she deferred.  She like to continue with therapy and bracing.  I will see her back in 3 months for reevaluation and we may consider a thumb injection at that time.  All question concerns were addressed.  She is agreeable.      ICD-10-CM ICD-9-CM   1. Laceration of left hand, foreign body presence unspecified, subsequent encounter  S61.412D V58.89     882.0   2. Trigger finger of left thumb  M65.312 727.03     Follow Up:   Return in about 3 months  (around 6/4/2025) for Follow Up.      Nai Schneider MD  [facility]

## 2025-05-08 ENCOUNTER — TELEPHONE (OUTPATIENT)
Dept: CARDIOLOGY | Facility: CLINIC | Age: 55
End: 2025-05-08
Payer: COMMERCIAL

## 2025-05-08 NOTE — TELEPHONE ENCOUNTER
Patient called and states she is going for dental work tomorrow. States she is s/p stent placement in September. States her dentist requested she ask her cardiologist if she needs abx.     Called patient and informed her she does not need abx prior to dental work for hx of stent placement per [facility]. She verbalizes understanding.

## 2025-07-10 RX ORDER — PEN NEEDLE, DIABETIC 31 GX5/16"
1 NEEDLE, DISPOSABLE MISCELLANEOUS DAILY
Qty: 100 EACH | Refills: 3 | Status: SHIPPED | OUTPATIENT
Start: 2025-07-10

## 2025-07-10 RX ORDER — DIPHENOXYLATE HYDROCHLORIDE AND ATROPINE SULFATE 2.5; .025 MG/1; MG/1
1 TABLET ORAL DAILY
Qty: 90 TABLET | Refills: 3 | Status: SHIPPED | OUTPATIENT
Start: 2025-07-10

## 2025-07-10 NOTE — TELEPHONE ENCOUNTER
"Rx Refill Note  Requested Prescriptions     Pending Prescriptions Disp Refills    multivitamin (MULTIVITAMIN PO) 30 tablet      Sig: Take  by mouth Daily.    Insulin Glargine (LANTUS SOLOSTAR) 100 UNIT/ML injection pen 27 mL 3     Sig: Inject 30 Units under the skin into the appropriate area as directed Daily.    metFORMIN (GLUCOPHAGE) 500 MG tablet 180 tablet 3     Sig: Take 1 tablet by mouth 2 (Two) Times a Day With Meals.    Insulin Pen Needle (Pen Needles 5/16\") 31G X 8 MM misc 100 each 3     Sig: Use pen needle as directed Daily.      Last office visit with prescribing clinician: 2/24/2025   Last telemedicine visit with prescribing clinician: Visit date not found   Next office visit with prescribing clinician: 7/24/2025                         Would you like a call back once the refill request has been completed: [] Yes [] No    If the office needs to give you a call back, can they leave a voicemail: [] Yes [] No    Haylee Tom MA  07/10/25, 12:04 EDT  "

## (undated) DEVICE — NDL PERC 1PRT THNWALL W/BASEPLT 18G 7CM

## (undated) DEVICE — NC TREK NEO™ CORONARY DILATATION CATHETER 3.25 MM X 15 MM / RAPID-EXCHANGE: Brand: NC TREK NEO™

## (undated) DEVICE — GW PERIPH VASC ADX J/TP SS .035 150CM 3MM

## (undated) DEVICE — PINNACLE INTRODUCER SHEATH: Brand: PINNACLE

## (undated) DEVICE — CATH DIAG EXPO M/ PK 6FR FL4/FR4 PIG 3PK

## (undated) DEVICE — PK CATH CARD 10

## (undated) DEVICE — DEV INFL MONARCH 25W

## (undated) DEVICE — LHK- LEFT HEART KIT BAPTIST: Brand: MEDLINE INDUSTRIES, INC.

## (undated) DEVICE — GW LUGE .014 182 CM

## (undated) DEVICE — MINI TREK CORONARY DILATATION CATHETER 2.0 MM X 12 MM / RAPID-EXCHANGE: Brand: MINI TREK

## (undated) DEVICE — ADULT, W/LG. BACK PAD, RADIOTRANSPARENT ELEMENT AND LEAD WIRE COMPATIBLE W/: Brand: DEFIBRILLATION ELECTRODES

## (undated) DEVICE — PRESSURE MONITORING SET: Brand: TRUWAVE, VAMP

## (undated) DEVICE — GUIDE CATHETER: Brand: MACH1™